# Patient Record
Sex: MALE | Race: WHITE | NOT HISPANIC OR LATINO
[De-identification: names, ages, dates, MRNs, and addresses within clinical notes are randomized per-mention and may not be internally consistent; named-entity substitution may affect disease eponyms.]

---

## 2017-01-20 ENCOUNTER — APPOINTMENT (OUTPATIENT)
Dept: PULMONOLOGY | Facility: CLINIC | Age: 72
End: 2017-01-20

## 2017-01-20 VITALS — DIASTOLIC BLOOD PRESSURE: 82 MMHG | HEART RATE: 70 BPM | SYSTOLIC BLOOD PRESSURE: 150 MMHG | OXYGEN SATURATION: 94 %

## 2017-01-20 VITALS — BODY MASS INDEX: 27.75 KG/M2 | WEIGHT: 222 LBS

## 2017-01-29 ENCOUNTER — RESULT REVIEW (OUTPATIENT)
Age: 72
End: 2017-01-29

## 2017-01-30 ENCOUNTER — APPOINTMENT (OUTPATIENT)
Dept: DERMATOLOGY | Facility: CLINIC | Age: 72
End: 2017-01-30

## 2017-02-03 ENCOUNTER — APPOINTMENT (OUTPATIENT)
Dept: DERMATOLOGY | Facility: CLINIC | Age: 72
End: 2017-02-03

## 2017-02-07 ENCOUNTER — RX RENEWAL (OUTPATIENT)
Age: 72
End: 2017-02-07

## 2017-04-20 ENCOUNTER — APPOINTMENT (OUTPATIENT)
Dept: PULMONOLOGY | Facility: CLINIC | Age: 72
End: 2017-04-20

## 2017-04-20 VITALS — HEART RATE: 64 BPM | OXYGEN SATURATION: 94 % | DIASTOLIC BLOOD PRESSURE: 70 MMHG | SYSTOLIC BLOOD PRESSURE: 140 MMHG

## 2017-04-20 VITALS — WEIGHT: 218 LBS | BODY MASS INDEX: 27.25 KG/M2

## 2017-04-20 DIAGNOSIS — M54.5 LOW BACK PAIN: ICD-10-CM

## 2017-04-20 DIAGNOSIS — Z01.89 ENCOUNTER FOR OTHER SPECIFIED SPECIAL EXAMINATIONS: ICD-10-CM

## 2017-04-20 RX ORDER — FEXOFENADINE HYDROCHLORIDE 180 MG/1
180 TABLET ORAL
Qty: 30 | Refills: 0 | Status: DISCONTINUED | COMMUNITY
Start: 2016-10-17

## 2017-04-26 ENCOUNTER — EMERGENCY (EMERGENCY)
Facility: HOSPITAL | Age: 72
LOS: 1 days | Discharge: HOME | End: 2017-04-26
Attending: EMERGENCY MEDICINE
Payer: MEDICARE

## 2017-04-26 VITALS
DIASTOLIC BLOOD PRESSURE: 73 MMHG | WEIGHT: 207.9 LBS | HEIGHT: 75 IN | SYSTOLIC BLOOD PRESSURE: 136 MMHG | RESPIRATION RATE: 18 BRPM | HEART RATE: 82 BPM | OXYGEN SATURATION: 100 % | TEMPERATURE: 100 F

## 2017-04-26 VITALS
SYSTOLIC BLOOD PRESSURE: 135 MMHG | HEART RATE: 79 BPM | OXYGEN SATURATION: 97 % | RESPIRATION RATE: 16 BRPM | DIASTOLIC BLOOD PRESSURE: 67 MMHG | TEMPERATURE: 100 F

## 2017-04-26 DIAGNOSIS — R07.9 CHEST PAIN, UNSPECIFIED: ICD-10-CM

## 2017-04-26 DIAGNOSIS — K21.9 GASTRO-ESOPHAGEAL REFLUX DISEASE WITHOUT ESOPHAGITIS: ICD-10-CM

## 2017-04-26 DIAGNOSIS — Z79.899 OTHER LONG TERM (CURRENT) DRUG THERAPY: ICD-10-CM

## 2017-04-26 DIAGNOSIS — R42 DIZZINESS AND GIDDINESS: ICD-10-CM

## 2017-04-26 LAB
ANION GAP SERPL CALC-SCNC: 15 MMOL/L — SIGNIFICANT CHANGE UP (ref 5–17)
BUN SERPL-MCNC: 33 MG/DL — HIGH (ref 8–20)
CALCIUM SERPL-MCNC: 10 MG/DL — SIGNIFICANT CHANGE UP (ref 8.6–10.2)
CHLORIDE SERPL-SCNC: 95 MMOL/L — LOW (ref 98–107)
CO2 SERPL-SCNC: 28 MMOL/L — SIGNIFICANT CHANGE UP (ref 22–29)
CREAT SERPL-MCNC: 0.87 MG/DL — SIGNIFICANT CHANGE UP (ref 0.5–1.3)
GLUCOSE SERPL-MCNC: 166 MG/DL — HIGH (ref 70–115)
HCT VFR BLD CALC: 48.4 % — SIGNIFICANT CHANGE UP (ref 42–52)
HGB BLD-MCNC: 16.3 G/DL — SIGNIFICANT CHANGE UP (ref 14–18)
MCHC RBC-ENTMCNC: 30.7 PG — SIGNIFICANT CHANGE UP (ref 27–31)
MCHC RBC-ENTMCNC: 33.7 G/DL — SIGNIFICANT CHANGE UP (ref 32–36)
MCV RBC AUTO: 91.1 FL — SIGNIFICANT CHANGE UP (ref 80–94)
PLATELET # BLD AUTO: 191 K/UL — SIGNIFICANT CHANGE UP (ref 150–400)
POTASSIUM SERPL-MCNC: 4.3 MMOL/L — SIGNIFICANT CHANGE UP (ref 3.5–5.3)
POTASSIUM SERPL-SCNC: 4.3 MMOL/L — SIGNIFICANT CHANGE UP (ref 3.5–5.3)
RBC # BLD: 5.31 M/UL — SIGNIFICANT CHANGE UP (ref 4.6–6.2)
RBC # FLD: 13.4 % — SIGNIFICANT CHANGE UP (ref 11–15.6)
SODIUM SERPL-SCNC: 138 MMOL/L — SIGNIFICANT CHANGE UP (ref 135–145)
TROPONIN T SERPL-MCNC: <0.01 NG/ML — SIGNIFICANT CHANGE UP (ref 0–0.06)
WBC # BLD: 10.2 K/UL — SIGNIFICANT CHANGE UP (ref 4.8–10.8)
WBC # FLD AUTO: 10.2 K/UL — SIGNIFICANT CHANGE UP (ref 4.8–10.8)

## 2017-04-26 PROCEDURE — 80048 BASIC METABOLIC PNL TOTAL CA: CPT

## 2017-04-26 PROCEDURE — 84484 ASSAY OF TROPONIN QUANT: CPT

## 2017-04-26 PROCEDURE — 71010: CPT | Mod: 26

## 2017-04-26 PROCEDURE — 93010 ELECTROCARDIOGRAM REPORT: CPT

## 2017-04-26 PROCEDURE — 93005 ELECTROCARDIOGRAM TRACING: CPT

## 2017-04-26 PROCEDURE — 99283 EMERGENCY DEPT VISIT LOW MDM: CPT | Mod: 25

## 2017-04-26 PROCEDURE — 71045 X-RAY EXAM CHEST 1 VIEW: CPT

## 2017-04-26 PROCEDURE — 99285 EMERGENCY DEPT VISIT HI MDM: CPT

## 2017-04-26 PROCEDURE — 85027 COMPLETE CBC AUTOMATED: CPT

## 2017-04-26 RX ORDER — ALBUTEROL 90 UG/1
0 AEROSOL, METERED ORAL
Qty: 0 | Refills: 0 | COMMUNITY

## 2017-04-26 RX ORDER — PANTOPRAZOLE SODIUM 20 MG/1
0 TABLET, DELAYED RELEASE ORAL
Qty: 0 | Refills: 0 | COMMUNITY

## 2017-04-26 RX ORDER — DOXYCYCLINE/OMEGA-3/EYE MASK 75 MG
0 COMBINATION PACKAGE (EA) ORAL
Qty: 0 | Refills: 0 | COMMUNITY

## 2017-04-26 NOTE — ED ADULT TRIAGE NOTE - CHIEF COMPLAINT QUOTE
pt bib ems s/p diaphoresis and dizziness at doctors office. pt c/o bilateral shoulder pain. pt states that he worked out yesterday. pt was given aspiring 325mg PO at doctors office. pt with no complaints at this time.

## 2017-04-26 NOTE — ED ADULT NURSE NOTE - OBJECTIVE STATEMENT
pt reports was at presurgical testing when he began to feel dizzy and became diaphoretic. pt reports he then developed rt shoulder pain radiating down the arm.. pt reports that symptoms have resolved. pt reports "worked out more than usual yesterday. was on the elliptical and treadmill a long time. I don't know if that has anything to do with it." pt has no complaints at this time. pt denies cardiac hx. a and o x3. escamilla. perrl. breathing even and unlabored. lungs cta, cap refill brisk. skin w/d/i. no le edema. + and = peripheral pulses. nsr on cm. abdomen soft nontender nondistended. will continue to monitor.

## 2017-04-26 NOTE — ED PROVIDER NOTE - OBJECTIVE STATEMENT
pt presents with brief epsiode chest taylor achy with diaphoresis now completely resolved. denies fever. denies HA or neck pain. no  sob. no abd pain. no n/v/d. no urinary f/u/d. no back pain. no motor or sensory deficits. denies drug use. no recent travel. no rash. no other acute issues symptoms or concerns no swelling

## 2017-04-26 NOTE — ED ADULT NURSE NOTE - PMH
BPH (benign prostatic hyperplasia)    GERD (gastroesophageal reflux disease)    Inguinal hernia    Skin cancer

## 2017-04-26 NOTE — ED PROVIDER NOTE - CHIEF COMPLAINT
The patient is a 71y Male complaining of dizziness.
Patient/Caregiver provided printed discharge information.

## 2017-04-26 NOTE — ED PROVIDER NOTE - MEDICAL DECISION MAKING DETAILS
DDX considered: stable angina, aortic dissection, pericarditis, pneumonia, pe, pts, chest wall pain, esophageal spasm and abdominal emergencies. I have discussed with patient with negative troponin and normal ekg their 28 day cardiac risk and they have agreed to outpt cardio f/ut his week without fail. asa per day until then. return to the ed immediately for any intractable chest pain or sob. pt agrees to plan of care

## 2017-05-01 ENCOUNTER — APPOINTMENT (OUTPATIENT)
Dept: DERMATOLOGY | Facility: CLINIC | Age: 72
End: 2017-05-01

## 2017-08-22 ENCOUNTER — APPOINTMENT (OUTPATIENT)
Dept: PULMONOLOGY | Facility: CLINIC | Age: 72
End: 2017-08-22
Payer: MEDICARE

## 2017-08-22 VITALS — OXYGEN SATURATION: 97 % | HEART RATE: 55 BPM | SYSTOLIC BLOOD PRESSURE: 110 MMHG | DIASTOLIC BLOOD PRESSURE: 60 MMHG

## 2017-08-22 VITALS — BODY MASS INDEX: 26.75 KG/M2 | WEIGHT: 214 LBS

## 2017-08-22 PROCEDURE — 99214 OFFICE O/P EST MOD 30 MIN: CPT | Mod: 25

## 2017-08-22 PROCEDURE — 94010 BREATHING CAPACITY TEST: CPT

## 2017-09-08 ENCOUNTER — OTHER (OUTPATIENT)
Age: 72
End: 2017-09-08

## 2017-10-10 ENCOUNTER — APPOINTMENT (OUTPATIENT)
Dept: DERMATOLOGY | Facility: CLINIC | Age: 72
End: 2017-10-10
Payer: MEDICARE

## 2017-10-10 PROCEDURE — 99214 OFFICE O/P EST MOD 30 MIN: CPT | Mod: 25

## 2017-10-10 PROCEDURE — 17110 DESTRUCTION B9 LES UP TO 14: CPT

## 2017-10-23 ENCOUNTER — APPOINTMENT (OUTPATIENT)
Dept: DERMATOLOGY | Facility: CLINIC | Age: 72
End: 2017-10-23

## 2018-04-10 ENCOUNTER — APPOINTMENT (OUTPATIENT)
Dept: DERMATOLOGY | Facility: CLINIC | Age: 73
End: 2018-04-10
Payer: MEDICARE

## 2018-04-10 PROCEDURE — 99213 OFFICE O/P EST LOW 20 MIN: CPT

## 2018-04-16 ENCOUNTER — APPOINTMENT (OUTPATIENT)
Dept: DERMATOLOGY | Facility: CLINIC | Age: 73
End: 2018-04-16

## 2018-05-04 ENCOUNTER — RECORD ABSTRACTING (OUTPATIENT)
Age: 73
End: 2018-05-04

## 2018-05-04 DIAGNOSIS — Z82.49 FAMILY HISTORY OF ISCHEMIC HEART DISEASE AND OTHER DISEASES OF THE CIRCULATORY SYSTEM: ICD-10-CM

## 2018-05-04 RX ORDER — CALCIUM CARBONATE/VITAMIN D3 600 MG-10
TABLET ORAL
Refills: 0 | Status: ACTIVE | COMMUNITY

## 2018-05-04 RX ORDER — CHROMIUM 200 MCG
TABLET ORAL
Refills: 0 | Status: ACTIVE | COMMUNITY

## 2018-07-10 ENCOUNTER — APPOINTMENT (OUTPATIENT)
Dept: CARDIOLOGY | Facility: CLINIC | Age: 73
End: 2018-07-10

## 2018-07-18 ENCOUNTER — APPOINTMENT (OUTPATIENT)
Dept: CARDIOLOGY | Facility: CLINIC | Age: 73
End: 2018-07-18
Payer: MEDICARE

## 2018-07-18 VITALS
DIASTOLIC BLOOD PRESSURE: 90 MMHG | BODY MASS INDEX: 25.86 KG/M2 | RESPIRATION RATE: 18 BRPM | HEART RATE: 65 BPM | SYSTOLIC BLOOD PRESSURE: 150 MMHG | HEIGHT: 75 IN | WEIGHT: 208 LBS

## 2018-07-18 PROBLEM — K40.90 UNILATERAL INGUINAL HERNIA, WITHOUT OBSTRUCTION OR GANGRENE, NOT SPECIFIED AS RECURRENT: Chronic | Status: ACTIVE | Noted: 2017-04-26

## 2018-07-18 PROBLEM — N40.0 BENIGN PROSTATIC HYPERPLASIA WITHOUT LOWER URINARY TRACT SYMPTOMS: Chronic | Status: ACTIVE | Noted: 2017-04-26

## 2018-07-18 PROBLEM — C44.90 UNSPECIFIED MALIGNANT NEOPLASM OF SKIN, UNSPECIFIED: Chronic | Status: ACTIVE | Noted: 2017-04-26

## 2018-07-18 PROBLEM — K21.9 GASTRO-ESOPHAGEAL REFLUX DISEASE WITHOUT ESOPHAGITIS: Chronic | Status: ACTIVE | Noted: 2017-04-26

## 2018-07-18 PROCEDURE — 93000 ELECTROCARDIOGRAM COMPLETE: CPT

## 2018-07-18 PROCEDURE — 99214 OFFICE O/P EST MOD 30 MIN: CPT

## 2018-07-20 ENCOUNTER — APPOINTMENT (OUTPATIENT)
Dept: PULMONOLOGY | Facility: CLINIC | Age: 73
End: 2018-07-20
Payer: MEDICARE

## 2018-07-20 VITALS — DIASTOLIC BLOOD PRESSURE: 84 MMHG | SYSTOLIC BLOOD PRESSURE: 128 MMHG

## 2018-07-20 VITALS — WEIGHT: 207 LBS | BODY MASS INDEX: 25.87 KG/M2

## 2018-07-20 DIAGNOSIS — Z82.49 FAMILY HISTORY OF ISCHEMIC HEART DISEASE AND OTHER DISEASES OF THE CIRCULATORY SYSTEM: ICD-10-CM

## 2018-07-20 PROCEDURE — 99215 OFFICE O/P EST HI 40 MIN: CPT | Mod: 25

## 2018-07-20 PROCEDURE — 94729 DIFFUSING CAPACITY: CPT

## 2018-07-20 PROCEDURE — 85018 HEMOGLOBIN: CPT | Mod: QW

## 2018-07-20 PROCEDURE — 94727 GAS DIL/WSHOT DETER LNG VOL: CPT

## 2018-07-20 PROCEDURE — 94010 BREATHING CAPACITY TEST: CPT

## 2018-07-20 RX ORDER — FEXOFENADINE HCL 60 MG
CAPSULE ORAL
Refills: 0 | Status: DISCONTINUED | COMMUNITY
End: 2018-07-20

## 2018-07-26 ENCOUNTER — CLINICAL ADVICE (OUTPATIENT)
Age: 73
End: 2018-07-26

## 2018-08-21 ENCOUNTER — APPOINTMENT (OUTPATIENT)
Dept: PULMONOLOGY | Facility: CLINIC | Age: 73
End: 2018-08-21
Payer: MEDICARE

## 2018-08-21 VITALS — DIASTOLIC BLOOD PRESSURE: 70 MMHG | HEART RATE: 65 BPM | OXYGEN SATURATION: 96 % | SYSTOLIC BLOOD PRESSURE: 132 MMHG

## 2018-08-21 VITALS — BODY MASS INDEX: 26 KG/M2 | WEIGHT: 208 LBS

## 2018-08-21 PROCEDURE — 99214 OFFICE O/P EST MOD 30 MIN: CPT

## 2018-08-21 RX ORDER — ALBUTEROL SULFATE 90 UG/1
108 (90 BASE) AEROSOL, METERED RESPIRATORY (INHALATION)
Qty: 1 | Refills: 3 | Status: DISCONTINUED | COMMUNITY
Start: 2017-01-20 | End: 2018-08-21

## 2018-08-23 ENCOUNTER — APPOINTMENT (OUTPATIENT)
Dept: CARDIOLOGY | Facility: CLINIC | Age: 73
End: 2018-08-23
Payer: MEDICARE

## 2018-08-23 PROCEDURE — 93306 TTE W/DOPPLER COMPLETE: CPT

## 2018-09-20 ENCOUNTER — APPOINTMENT (OUTPATIENT)
Dept: CARDIOLOGY | Facility: CLINIC | Age: 73
End: 2018-09-20
Payer: MEDICARE

## 2018-09-20 PROCEDURE — 93351 STRESS TTE COMPLETE: CPT

## 2018-10-11 ENCOUNTER — APPOINTMENT (OUTPATIENT)
Dept: DERMATOLOGY | Facility: CLINIC | Age: 73
End: 2018-10-11
Payer: MEDICARE

## 2018-10-11 PROCEDURE — 99214 OFFICE O/P EST MOD 30 MIN: CPT

## 2018-11-05 ENCOUNTER — APPOINTMENT (OUTPATIENT)
Dept: CARDIOLOGY | Facility: CLINIC | Age: 73
End: 2018-11-05
Payer: MEDICARE

## 2018-11-05 VITALS
HEART RATE: 65 BPM | DIASTOLIC BLOOD PRESSURE: 68 MMHG | SYSTOLIC BLOOD PRESSURE: 145 MMHG | HEIGHT: 75 IN | RESPIRATION RATE: 18 BRPM | BODY MASS INDEX: 26.61 KG/M2 | WEIGHT: 214 LBS

## 2018-11-05 PROCEDURE — 93000 ELECTROCARDIOGRAM COMPLETE: CPT

## 2018-11-05 PROCEDURE — 99214 OFFICE O/P EST MOD 30 MIN: CPT

## 2018-11-05 NOTE — PHYSICAL EXAM
[FreeTextEntry1] :                    Well appearing and nourished with no obvious deformities or distress.\par \par Eyes: \par No conjunctival injection and no xanthelasmas.\par HEENT: \par Normocephalic.Normal oral mucosa. No pallor or cyanosis\par Neck: \par No jugular venous distension. with normal A and V wave forms. No palpable adenopathy.\par Cardiovascular: \par Normal rate and rhythm with normal S1, S2 and a grade 1/6 systolic murmur. Distal arterial pulses are normal. No significant peripheral edema.\par Pulmonary: \par Lungs are clear to auscultation and percussion. Normal respiratory pattern without any accessory muscle use\par Abdomen: \par Soft, non-tender ; no palpable organomegaly or masses.\par Extremities:\par No digital clubbing, cyanosis or ischemic changes.\par Skin: \par No skin lesions, rashes, ulcers or xanthomas.\par Psychiatric: \par Alert and oriented to person, place and time. Appropriate mood and affect.

## 2018-11-05 NOTE — ASSESSMENT
[FreeTextEntry1] : The ECG shows sinus rhythm at 66 bpm.  Normal axis and intervals and no significant ST-T wave changes. \par \par Stress echo 9/20/18:\par Patient exercised 10 minutes 30 seconds (11 Mets).\par Peak heart rate 139 (93% maximum).\par Blood pressure began high but response is normal\par ECG showed no ischemic changes\par Baseline left ventricular ejection fraction 60-65% augmenting appropriately to 70-75% with exercise without any wall motion denies. Test is considered negative for ischemia.\par \par \par Laboratory data on 4/13/18 shows normal electrolytes and LFTs.  \par Cholesterol 163\par HDL 72\par LDL 82 \par Hemoglobin 13.8\par BUN a bit high at 33.  Fairly similar to laboratory data from October of 2017.  \par \par Impression:\par 1.  The patient has exertional dyspnea.Based on the results of the stress testing, he seems to have reasonably good exercise tolerance without any evidence of exercise-induced ischemia. Do not believe that there is a cardiac etiology to the shortness of breath.\par 2.  Blood pressure seems to be running persistently high and with LVH on Echo he requires Rx.\par 3.  Lipid control seems to be adequate.\par \par \par Recommendations:\par 1.  Home blood pressure monitoring and keep a chart which he will call in with in 3-4 weeks\par 2.  Reducing sodium intake.  This will be especially important when he travels to [place] shortly.\par 3.  Rx Lisinopril 10 mg Po daily\par 4.  Repeat an echocardiogram to reassess cardiac chamber enlargement.\par 6.  Follow-up 3 months

## 2018-11-05 NOTE — REASON FOR VISIT
[FreeTextEntry1] : This is a 73-year-old male who presents here for cardiac reevaluation with concerns about some exertional shortness of breath.  He denies chest pain or palpitations.  No paroxysmal nocturnal dyspnea, orthopnea, or edema. \par \par   His history includes: \par 1.  A significant family history of premature coronary artery disease. \par  2.  A history of hyperlipidemia.\par 3.   He denies hypertension but does have left ventricular hypertrophy and biatrial enlargement. \par Review of his blood pressure log from home over the course of last month shows persistent elevation of his systolic blood pressures in the mid 140s on average.\par Diastolics are in the 70s to 80 range.\par \par  He does not have diabetes.  He continues to take his meds.   He did have some problem with esophageal ulcers and therefore is not taking Aspirin.

## 2018-11-26 ENCOUNTER — OTHER (OUTPATIENT)
Age: 73
End: 2018-11-26

## 2018-11-26 RX ORDER — LISINOPRIL 10 MG/1
10 TABLET ORAL DAILY
Qty: 90 | Refills: 3 | Status: DISCONTINUED | COMMUNITY
Start: 2018-11-05 | End: 2018-11-26

## 2018-12-19 ENCOUNTER — APPOINTMENT (OUTPATIENT)
Dept: CARDIOLOGY | Facility: CLINIC | Age: 73
End: 2018-12-19
Payer: MEDICARE

## 2018-12-19 VITALS
BODY MASS INDEX: 26.49 KG/M2 | DIASTOLIC BLOOD PRESSURE: 70 MMHG | HEART RATE: 63 BPM | WEIGHT: 213 LBS | SYSTOLIC BLOOD PRESSURE: 122 MMHG | HEIGHT: 75 IN | RESPIRATION RATE: 16 BRPM

## 2018-12-19 PROCEDURE — 99214 OFFICE O/P EST MOD 30 MIN: CPT

## 2018-12-19 PROCEDURE — 93000 ELECTROCARDIOGRAM COMPLETE: CPT

## 2018-12-19 NOTE — ASSESSMENT
[FreeTextEntry1] : The ECG shows sinus rhythm at 63 bpm.  Normal axis and intervals and no significant ST-T wave changes. \par \par Assessment of his home blood pressure cuff versus our wall cuff was made. Multiple assessment indicate that his blood pressure cuff is clearly an error registering at least 15 mm too high.\par \par Stress echo 9/20/18:\par Patient exercised 10 minutes 30 seconds (11 Mets).\par Peak heart rate 139 (93% maximum).\par Blood pressure began high but response is normal\par ECG showed no ischemic changes\par Baseline left ventricular ejection fraction 60-65% augmenting appropriately to 70-75% with exercise without any wall motion denies. \par Test is considered negative for ischemia.\par \par \par Laboratory data 12/14/18:\par Electrolytes and LFTs are normal\par Cholesterol 155\par HDL 51\par LDL 93\par Hemoglobin 14.1\par \par \par Impression:\par 1.  The patient has exertional dyspnea.Based on the results of the stress testing, he seems to have reasonably good exercise tolerance without any evidence of exercise-induced ischemia. Do not believe that there is a cardiac etiology to the shortness of breath.\par \par 2.  Blood pressure seems to be controlled with Irbesartan.\par \par 3.  Lipid control seems to be adequate.\par \par \par Recommendations:\par 1.  Home blood pressure monitor is faulty and should be replaced and he should check BP later in the day\par 2.  Reducing sodium intake.  This will be especially important when he travels\par 3.  Follow-up 3 months

## 2018-12-19 NOTE — REASON FOR VISIT
[FreeTextEntry1] : This is a 73-year-old male who presents here for cardiac reevaluation with concerns about\par \par - treatment of his HTN\par - some exertional shortness of breath. \par  He denies chest pain or palpitations.  No paroxysmal nocturnal dyspnea, orthopnea, or edema. \par \par   His history includes: \par 1.  A significant family history of premature coronary artery disease. \par  2.  A history of hyperlipidemia.\par 3. Newly dx'd hypertension with  left ventricular hypertrophy and biatrial enlargement. \par \par Was begun on lisinopril 10 mg p.o. daily end of November. \par He subsequently developed a dry cough and this was stopped.\par Irbesartan 150 mg daily was begun November 26. This has been better tolerated.\par  His blood pressures at home we made a bit high, however, he has been taking this first thing in the morning before he takes his medication.\par \par \par  He does not have diabetes.  He continues to take his meds.   He did have some problem with esophageal ulcers and therefore is not taking Aspirin.

## 2018-12-20 ENCOUNTER — APPOINTMENT (OUTPATIENT)
Dept: PULMONOLOGY | Facility: CLINIC | Age: 73
End: 2018-12-20
Payer: MEDICARE

## 2018-12-20 VITALS
BODY MASS INDEX: 25.62 KG/M2 | WEIGHT: 205 LBS | OXYGEN SATURATION: 98 % | SYSTOLIC BLOOD PRESSURE: 106 MMHG | DIASTOLIC BLOOD PRESSURE: 58 MMHG | HEART RATE: 64 BPM

## 2018-12-20 PROCEDURE — 99214 OFFICE O/P EST MOD 30 MIN: CPT

## 2019-03-20 ENCOUNTER — NON-APPOINTMENT (OUTPATIENT)
Age: 74
End: 2019-03-20

## 2019-03-20 ENCOUNTER — RECORD ABSTRACTING (OUTPATIENT)
Age: 74
End: 2019-03-20

## 2019-03-20 ENCOUNTER — APPOINTMENT (OUTPATIENT)
Dept: CARDIOLOGY | Facility: CLINIC | Age: 74
End: 2019-03-20
Payer: MEDICARE

## 2019-03-20 VITALS
HEART RATE: 62 BPM | WEIGHT: 212 LBS | DIASTOLIC BLOOD PRESSURE: 78 MMHG | HEIGHT: 75 IN | SYSTOLIC BLOOD PRESSURE: 120 MMHG | RESPIRATION RATE: 16 BRPM | BODY MASS INDEX: 26.36 KG/M2

## 2019-03-20 PROCEDURE — 99214 OFFICE O/P EST MOD 30 MIN: CPT

## 2019-03-20 PROCEDURE — 93000 ELECTROCARDIOGRAM COMPLETE: CPT

## 2019-03-20 NOTE — REASON FOR VISIT
[FreeTextEntry1] : This is a 73-year-old male who presents here for cardiac reevaluation with concerns about\par \par - treatment of his HTN\par - some exertional shortness of breath. \par - HLD\par - MARILUZ on CPAP\par  He denies chest pain or palpitations.  No paroxysmal nocturnal dyspnea, orthopnea, or edema. \par \par   His history includes: \par 1.  A significant family history of premature coronary artery disease. \par  2.  A history of hyperlipidemia.\par 3.  Newly dx'd hypertension with  left ventricular hypertrophy and biatrial enlargement. \par \par Was begun on lisinopril 10 mg p.o. daily end of November. \par He subsequently developed a dry cough and this was stopped.\par Irbesartan 150 mg daily was begun November 26. This has been better tolerated.\par  His blood pressures at home running 105 - 140/60-70 \par Patient denies any chest pain, shortness of breath, palpitations, PND, orthopnea or edema\par \par \par  He does not have diabetes.  He continues to take his meds.   He did have some problem with esophageal ulcers and therefore is not taking Aspirin.

## 2019-03-20 NOTE — ASSESSMENT
[FreeTextEntry1] : The ECG shows sinus rhythm at 62 bpm.  Normal axis and intervals and no significant ST-T wave changes. \par \par Repeat assessment of his home blood pressure cuff versus our wall cuff was made and iindicates that his blood pressure cuff is possibly off by 10 mmHg\par \par Stress echo 9/20/18:\par Patient exercised 10 minutes 30 seconds (11 Mets).\par Peak heart rate 139 (93% maximum).\par Blood pressure began high but response is normal\par ECG showed no ischemic changes\par Baseline left ventricular ejection fraction 60-65% augmenting appropriately to 70-75% with exercise without any wall motion denies. \par Test is considered negative for ischemia.\par \par \par Laboratory data 12/14/18:\par Electrolytes and LFTs are normal\par Cholesterol 155\par HDL 51\par LDL 93\par Hemoglobin 14.1\par \par \par Impression:\par 1.  The patient has exertional dyspnea.Based on the results of the stress testing, he seems to have reasonably good exercise tolerance without any evidence of exercise-induced ischemia. Do not believe that there is a cardiac etiology to the shortness of breath.\par \par 2.  Blood pressure seems to be controlled with Irbesartan.\par \par 3.  Lipid control seems to be adequate.\par \par 4. MARILUZ is being consistently treated\par \par Recommendations:\par 1.  Home blood pressure monitoring to continue\par 2.  Reducing sodium intake.  This will be especially important when he travels\par 3.  Full panel of labs by May \par 4. Follow-up 6 months

## 2019-04-08 ENCOUNTER — APPOINTMENT (OUTPATIENT)
Dept: DERMATOLOGY | Facility: CLINIC | Age: 74
End: 2019-04-08
Payer: MEDICARE

## 2019-04-08 PROCEDURE — 17000 DESTRUCT PREMALG LESION: CPT

## 2019-04-08 PROCEDURE — 99214 OFFICE O/P EST MOD 30 MIN: CPT | Mod: 25

## 2019-05-08 ENCOUNTER — RX CHANGE (OUTPATIENT)
Age: 74
End: 2019-05-08

## 2019-05-08 RX ORDER — IRBESARTAN 150 MG/1
150 TABLET ORAL DAILY
Qty: 90 | Refills: 3 | Status: DISCONTINUED | COMMUNITY
Start: 2018-11-26 | End: 2019-05-08

## 2019-06-14 ENCOUNTER — APPOINTMENT (OUTPATIENT)
Dept: PULMONOLOGY | Facility: CLINIC | Age: 74
End: 2019-06-14
Payer: MEDICARE

## 2019-06-14 VITALS
DIASTOLIC BLOOD PRESSURE: 72 MMHG | WEIGHT: 205 LBS | BODY MASS INDEX: 25.62 KG/M2 | OXYGEN SATURATION: 97 % | SYSTOLIC BLOOD PRESSURE: 126 MMHG | HEART RATE: 81 BPM

## 2019-06-14 PROCEDURE — 99214 OFFICE O/P EST MOD 30 MIN: CPT

## 2019-06-14 RX ORDER — POLYMYXIN B SULFATE AND TRIMETHOPRIM 10000; 1 [USP'U]/ML; MG/ML
10000-0.1 SOLUTION OPHTHALMIC
Qty: 10 | Refills: 0 | Status: DISCONTINUED | COMMUNITY
Start: 2019-04-18

## 2019-06-14 RX ORDER — TROSPIUM CHLORIDE 20 MG/1
TABLET, FILM COATED ORAL
Refills: 0 | Status: DISCONTINUED | COMMUNITY
End: 2019-06-14

## 2019-06-14 NOTE — DISCUSSION/SUMMARY
[FreeTextEntry1] : \par #1. Completed Breo and now no longer on chronic BD therapy; symptomatically he is back to baseline; prior MCT was negative for asthma\par #2. ProAir as needed\par #3. Continue CPAP at 10 cm of water with new machine\par #4. Replace equipment as needed; ordered 6/14/19\par #5. F/u in 6 month with compliance \par #6. Repeat PFTs were essentially normal off of therapy\par #7. Consider repeat chest CT to reevaluate bronchial thickening but he would like to hold off for now given resolution of symptoms and normal PFTs\par #8. Uses SoClean

## 2019-06-14 NOTE — REVIEW OF SYSTEMS
[As Noted in HPI] : as noted in HPI [Fever] : no fever [Chills] : no chills [Dry Eyes] : no dryness of the eyes [Eye Irritation] : no ~T irritation of the eyes [Nasal Congestion] : no nasal congestion [Epistaxis] : no nosebleeds [Postnasal Drip] : no postnasal drip [Sinus Problems] : no sinus problems [Cough] : no cough [Hemoptysis] : no hemoptysis [Sputum] : not coughing up ~M sputum [Dyspnea] : no dyspnea [Chest Tightness] : no chest tightness [Pleuritic Pain] : no pleuritic pain [Wheezing] : no wheezing [Hypertension] : no ~T hypertension [Chest Discomfort] : no chest discomfort [Dysrhythmia] : no dysrhythmia [Murmurs] : no murmurs were heard [Palpitations] : no palpitations [Edema] : ~T edema was not present [Hay Fever] : no hay fever [Itchy Eyes] : no itching of ~T the eyes [Reflux] : no reflux [Nausea] : no nausea [Vomiting] : no vomiting [Constipation] : no constipation [Diarrhea] : no diarrhea [Trauma] : no ~T physical trauma [Abdominal Pain] : no abdominal pain [Dysuria] : no dysuria [Fracture] : no fracture [Headache] : no headache [Anemia] : no anemia [Dizziness] : no dizziness [Syncope] : no fainting [Numbness] : no numbness [Depression] : no depression [Paralysis] : no paralysis was seen [Seizures] : no seizures [Anxiety] : no anxiety [Diabetes] : no diabetes mellitus [Thyroid Problem] : no thyroid problem

## 2019-06-14 NOTE — CONSULT LETTER
[Consult Letter:] : I had the pleasure of evaluating your patient, [unfilled]. [Please see my note below.] : Please see my note below. [Dear  ___] : Dear  [unfilled], [Sincerely,] : Sincerely, [Consult Closing:] : Thank you very much for allowing me to participate in the care of this patient.  If you have any questions, please do not hesitate to contact me. [DrKeyla  ___] : Dr. ROONEY [Clark Nevarez MD] : Clark Nevarez MD [FreeTextEntry3] : Clark Nevarez MD, FCCP, JAYLIN. ABSM\par

## 2019-06-14 NOTE — HISTORY OF PRESENT ILLNESS
[CPAP] : CPAP [Follow-Up - Routine Clinic] : a routine clinic follow-up of [Intermittent] : intermittent [None] : No associated symptoms are reported [Inhaled Short-Acting Beta-2 Agonists] : inhaled short-acting beta-2 agonists [Good Compliance] : good compliance with treatment [Good Tolerance] : good tolerance of treatment [Good Symptom Control] : good symptom control [CT Scan] : a CT scan [On ___] : performed on [unfilled] [Indication ___] : for an indication of [unfilled] [Patient] : the patient [FreeTextEntry1] : The patient was suffering from cough, nasal congestion, wheeze, and SOB. CXR was without infiltrates. He was treated with abx and steroids but without significant improvement. He subsequently underwent chest CT and sinus CT which revealed sinusitis and bronchial thickening but without pneumonia. He was started on steroids and Breo now with near resolution of symptoms. He is concerned that his CPAP therapy may have contributed to his infection though he has been on CPAP for many years. \par He is now near baseline. [de-identified] : at 8 cm of water [FreeTextEntry5] : chest and sinus CT [FreeTextEntry8] : Bronchial thickening without consolidation or infiltrates; sinus thickening c/w sinusitis

## 2019-06-14 NOTE — PHYSICAL EXAM
[General Appearance - Well Developed] : well developed [Normal Appearance] : normal appearance [Normal Conjunctiva] : the conjunctiva exhibited no abnormalities [General Appearance - In No Acute Distress] : no acute distress [Elongated Uvula] : elongated uvula [Enlarged Base of the Tongue] : enlargement of the base of the tongue [Low Lying Soft Palate] : low lying soft palate [III] : III [Neck Appearance] : the appearance of the neck was normal [Heart Sounds] : normal S1 and S2 [Murmurs] : no murmurs present [Heart Rate And Rhythm] : heart rate and rhythm were normal [Edema] : no peripheral edema present [] : no respiratory distress [Respiration, Rhythm And Depth] : normal respiratory rhythm and effort [Auscultation Breath Sounds / Voice Sounds] : lungs were clear to auscultation bilaterally [Exaggerated Use Of Accessory Muscles For Inspiration] : no accessory muscle use [Abdomen Soft] : soft [Nail Clubbing] : no clubbing of the fingernails [Abnormal Walk] : normal gait [Abdomen Tenderness] : non-tender [No Focal Deficits] : no focal deficits [Oriented To Time, Place, And Person] : oriented to person, place, and time [Cyanosis, Localized] : no localized cyanosis [FreeTextEntry1] : Moderate oropharyngeal crowding.

## 2019-06-14 NOTE — REASON FOR VISIT
[Asthma] : asthma [Follow-Up] : a follow-up visit [Sleep Apnea] : sleep apnea [Shortness of Breath] : shortness of Breath [Cough] : cough [FreeTextEntry2] : weight issues

## 2019-09-19 ENCOUNTER — APPOINTMENT (OUTPATIENT)
Dept: CARDIOLOGY | Facility: CLINIC | Age: 74
End: 2019-09-19
Payer: MEDICARE

## 2019-09-19 ENCOUNTER — NON-APPOINTMENT (OUTPATIENT)
Age: 74
End: 2019-09-19

## 2019-09-19 VITALS
BODY MASS INDEX: 26.36 KG/M2 | DIASTOLIC BLOOD PRESSURE: 80 MMHG | SYSTOLIC BLOOD PRESSURE: 140 MMHG | HEIGHT: 75 IN | RESPIRATION RATE: 16 BRPM | WEIGHT: 212 LBS | OXYGEN SATURATION: 98 % | HEART RATE: 60 BPM

## 2019-09-19 PROCEDURE — 99214 OFFICE O/P EST MOD 30 MIN: CPT

## 2019-09-19 PROCEDURE — 93000 ELECTROCARDIOGRAM COMPLETE: CPT

## 2019-09-19 NOTE — HISTORY OF PRESENT ILLNESS
[FreeTextEntry1] : Was begun on lisinopril 10 mg p.o. daily  11/18\par He subsequently developed a dry cough and this was stopped.\par Irbesartan 150 mg daily was begun November 26/18. This has been better tolerated but changed to telmisartan bec of shortages.\par \par History of esophageal ulcers and therefore is not taking Aspirin.

## 2019-09-19 NOTE — ASSESSMENT
[FreeTextEntry1] : The ECG shows sinus rhythm at 60 bpm otherwise WNL\par \par Lab data  4/10/19\par Chol 141\par LDL   76\par HDL   50\par Tri.    69\par A1C 5.1\par LFTS WNL\par Creat 0.89\par \par Stress echo 9/20/18:\par Patient exercised 10 minutes 30 seconds (11 Mets).\par Peak heart rate 139 (93% maximum).\par Blood pressure began high but response is normal\par ECG showed no ischemic changes\par Baseline left ventricular ejection fraction 60-65% augmenting appropriately to 70-75% with exercise without any wall motion denies. \par Test is considered negative for ischemia.\par \par \par \par Impression:\par 1.  The patient has  mild exertional dyspnea.Based on the results of the stress testing, he seems to have reasonably good exercise tolerance without any evidence of exercise-induced ischemia. Do not believe that there is a cardiac etiology to the shortness of breath.\par \par 2.  Blood pressure seems to be controlled with Telmisartan. Today 140/80 and stable.\par \par 3.  Lipid control is adequate.\par \par 4. MARILUZ is being consistently treated\par \par Recommendations:\par 1.  Home blood pressure monitoring to continue\par 2.  Reducing sodium intake.  This will be especially important when he travels\par 3. Blood work to be checked every 6 months moving forward.\par \par Clinical follow up in 6 months

## 2019-09-19 NOTE — REASON FOR VISIT
[FreeTextEntry1] : This is a 74-year-old male who presents here for cardiac reevaluation. During our last visit we discussed his concerns of :\par \par - treatment of his HTN\par - some exertional shortness of breath. \par - HLD\par - MARILUZ on CPAP\par  He denies chest pain or palpitations.  No paroxysmal nocturnal dyspnea, orthopnea, or edema. \par \par   His history includes: \par 1.  A significant family history of premature coronary artery disease. \par  2.  A history of hyperlipidemia.\par 3.  Newly dx'd hypertension with  left ventricular hypertrophy and biatrial enlargement. \par \par \par Patient denies any chest pain, shortness of breath, palpitations, PND, orthopnea or edema\par \par Some mild SAVAGE during a recent trip to [employer] while climbing stairs otherwise no exertional discomfort.\par \par  States blood pressures have been controlled and continues to use his CPAP nightly\par \par   He continues to take his meds.

## 2019-10-08 ENCOUNTER — APPOINTMENT (OUTPATIENT)
Dept: DERMATOLOGY | Facility: CLINIC | Age: 74
End: 2019-10-08
Payer: MEDICARE

## 2019-10-08 PROCEDURE — 99213 OFFICE O/P EST LOW 20 MIN: CPT

## 2019-10-11 ENCOUNTER — RX RENEWAL (OUTPATIENT)
Age: 74
End: 2019-10-11

## 2019-10-17 ENCOUNTER — RESULT REVIEW (OUTPATIENT)
Age: 74
End: 2019-10-17

## 2019-10-17 ENCOUNTER — OTHER (OUTPATIENT)
Age: 74
End: 2019-10-17

## 2019-10-17 RX ORDER — TELMISARTAN 40 MG/1
40 TABLET ORAL DAILY
Qty: 90 | Refills: 1 | Status: DISCONTINUED | COMMUNITY
Start: 2019-05-08 | End: 2019-10-17

## 2019-11-26 ENCOUNTER — APPOINTMENT (OUTPATIENT)
Dept: CARDIOLOGY | Facility: CLINIC | Age: 74
End: 2019-11-26

## 2019-12-21 ENCOUNTER — TRANSCRIPTION ENCOUNTER (OUTPATIENT)
Age: 74
End: 2019-12-21

## 2020-01-06 ENCOUNTER — APPOINTMENT (OUTPATIENT)
Dept: PULMONOLOGY | Facility: CLINIC | Age: 75
End: 2020-01-06
Payer: MEDICARE

## 2020-01-06 VITALS — SYSTOLIC BLOOD PRESSURE: 120 MMHG | HEART RATE: 60 BPM | DIASTOLIC BLOOD PRESSURE: 74 MMHG | OXYGEN SATURATION: 97 %

## 2020-01-06 VITALS — WEIGHT: 213 LBS | HEIGHT: 75 IN | BODY MASS INDEX: 26.49 KG/M2

## 2020-01-06 PROCEDURE — 94010 BREATHING CAPACITY TEST: CPT

## 2020-01-06 PROCEDURE — 99214 OFFICE O/P EST MOD 30 MIN: CPT | Mod: 25

## 2020-01-06 NOTE — REVIEW OF SYSTEMS
[As Noted in HPI] : as noted in HPI [Fever] : no fever [Chills] : no chills [Dry Eyes] : no dryness of the eyes [Eye Irritation] : no ~T irritation of the eyes [Nasal Congestion] : no nasal congestion [Postnasal Drip] : no postnasal drip [Epistaxis] : no nosebleeds [Sinus Problems] : no sinus problems [Cough] : no cough [Sputum] : not coughing up ~M sputum [Hemoptysis] : no hemoptysis [Dyspnea] : no dyspnea [Chest Tightness] : no chest tightness [Pleuritic Pain] : no pleuritic pain [Wheezing] : no wheezing [Hypertension] : no ~T hypertension [Chest Discomfort] : no chest discomfort [Dysrhythmia] : no dysrhythmia [Murmurs] : no murmurs were heard [Palpitations] : no palpitations [Edema] : ~T edema was not present [Hay Fever] : no hay fever [Itchy Eyes] : no itching of ~T the eyes [Reflux] : no reflux [Nausea] : no nausea [Vomiting] : no vomiting [Constipation] : no constipation [Diarrhea] : no diarrhea [Abdominal Pain] : no abdominal pain [Trauma] : no ~T physical trauma [Dysuria] : no dysuria [Fracture] : no fracture [Anemia] : no anemia [Headache] : no headache [Dizziness] : no dizziness [Syncope] : no fainting [Numbness] : no numbness [Paralysis] : no paralysis was seen [Seizures] : no seizures [Depression] : no depression [Anxiety] : no anxiety [Diabetes] : no diabetes mellitus [Thyroid Problem] : no thyroid problem

## 2020-01-06 NOTE — HISTORY OF PRESENT ILLNESS
[CPAP] : CPAP [Follow-Up - Routine Clinic] : a routine clinic follow-up of [Intermittent] : intermittent [Inhaled Short-Acting Beta-2 Agonists] : inhaled short-acting beta-2 agonists [None] : No associated symptoms are reported [Good Tolerance] : good tolerance of treatment [Good Compliance] : good compliance with treatment [CT Scan] : a CT scan [Good Symptom Control] : good symptom control [On ___] : performed on [unfilled] [Patient] : the patient [Indication ___] : for an indication of [unfilled] [FreeTextEntry1] : The patient was suffering from cough, nasal congestion, wheeze, and SOB. CXR was without infiltrates. He was treated with abx and steroids but without significant improvement. He subsequently underwent chest CT and sinus CT which revealed sinusitis and bronchial thickening but without pneumonia. He was started on steroids and Breo now with near resolution of symptoms. He is concerned that his CPAP therapy may have contributed to his infection though he has been on CPAP for many years. \par He is now near baseline. [de-identified] : at 10 cm of water [FreeTextEntry5] : chest and sinus CT [FreeTextEntry8] : Bronchial thickening without consolidation or infiltrates; sinus thickening c/w sinusitis

## 2020-01-06 NOTE — CONSULT LETTER
[Consult Letter:] : I had the pleasure of evaluating your patient, [unfilled]. [Dear  ___] : Dear  [unfilled], [Consult Closing:] : Thank you very much for allowing me to participate in the care of this patient.  If you have any questions, please do not hesitate to contact me. [Please see my note below.] : Please see my note below. [Sincerely,] : Sincerely, [Clark Nevarez MD] : Clark Nevarez MD [DrKeyla  ___] : Dr. ROONEY [FreeTextEntry3] : Clark Nevarez MD, FCCP, D. ABSM\par Pulmonary and Sleep Medicine\par [facility] [place]

## 2020-01-06 NOTE — DISCUSSION/SUMMARY
[FreeTextEntry1] : \par #1. Completed Breo and now no longer on chronic BD therapy; symptomatically he is back to baseline; prior MCT was negative for asthma\par #2. ProAir as needed\par #3. Continue CPAP at 10 cm of water with new machine\par #4. Replace equipment as needed; ordered 1/6/20\par #5. F/u in 9 month with compliance \par #6. Repeat PFTs were essentially normal off of therapy\par #7. Consider repeat chest CT to reevaluate bronchial thickening but he would like to hold off for now given resolution of symptoms and normal PFTs\par #8. Uses SoClean

## 2020-01-06 NOTE — REASON FOR VISIT
[Asthma] : asthma [Follow-Up] : a follow-up visit [Cough] : cough [Shortness of Breath] : shortness of Breath [Sleep Apnea] : sleep apnea [FreeTextEntry2] : weight issues

## 2020-01-06 NOTE — PHYSICAL EXAM
[General Appearance - Well Developed] : well developed [General Appearance - In No Acute Distress] : no acute distress [Normal Appearance] : normal appearance [Low Lying Soft Palate] : low lying soft palate [Normal Conjunctiva] : the conjunctiva exhibited no abnormalities [Enlarged Base of the Tongue] : enlargement of the base of the tongue [Elongated Uvula] : elongated uvula [III] : III [Neck Appearance] : the appearance of the neck was normal [Heart Sounds] : normal S1 and S2 [Murmurs] : no murmurs present [Heart Rate And Rhythm] : heart rate and rhythm were normal [Edema] : no peripheral edema present [] : no respiratory distress [Respiration, Rhythm And Depth] : normal respiratory rhythm and effort [Exaggerated Use Of Accessory Muscles For Inspiration] : no accessory muscle use [Auscultation Breath Sounds / Voice Sounds] : lungs were clear to auscultation bilaterally [Abdomen Soft] : soft [Abdomen Tenderness] : non-tender [Abnormal Walk] : normal gait [Nail Clubbing] : no clubbing of the fingernails [No Focal Deficits] : no focal deficits [Cyanosis, Localized] : no localized cyanosis [Oriented To Time, Place, And Person] : oriented to person, place, and time [FreeTextEntry1] : No abnormalities

## 2020-01-09 ENCOUNTER — RX RENEWAL (OUTPATIENT)
Age: 75
End: 2020-01-09

## 2020-02-12 NOTE — ED PROVIDER NOTE - PHYSICAL EXAMINATION
tablet Take 200 mg by mouth daily       Multiple Vitamin (MULTI-VITAMINS PO) Take  by mouth.  escitalopram (LEXAPRO) 20 MG tablet Take 30 mg by mouth daily Take 1 in half tab daily to equal 30mg       No current facility-administered medications for this visit. Allergies: Adhesive tape  Past Medical History:   Diagnosis Date    Anxiety     Back pain     Depression     Hepatitis C     History of anemia     History of gallstones     History of kidney stones     Knee pain     left    Postpartum depression      Past Surgical History:   Procedure Laterality Date     SECTION      TONSILLECTOMY       Family History   Problem Relation Age of Onset    Cancer Sister         skin cancer, leukemia    Heart Disease Sister     Breast Cancer Sister     Celiac Disease Sister     Other Sister         lyme dz    Diabetes Mother     Thyroid Disease Mother     Lung Cancer Maternal Grandfather     Cancer Maternal Grandfather         throat cancer    Thyroid Disease Sister     Colon Cancer Neg Hx     Colon Polyps Neg Hx     Esophageal Cancer Neg Hx     Liver Cancer Neg Hx     Liver Disease Neg Hx     Rectal Cancer Neg Hx     Stomach Cancer Neg Hx      Social History     Tobacco Use    Smoking status: Former Smoker     Last attempt to quit: 2010     Years since quittin.7    Smokeless tobacco: Never Used   Substance Use Topics    Alcohol use: No       Old records obtained from the referring provider. These records have been reviewed and summarized. Review of Systems    Constitutional - no significant activity change, appetite change, or unexpected weight change. No fever or chills. No diaphoresis or significant fatigue. HENT - no significant rhinorrhea or epistaxis. No tinnitus or significant hearing loss. Eyes - no sudden vision change or amaurosis. Respiratory - no significant shortness of breath, wheezing, or stridor.   No apnea, cough, or chest tightness associated with shortness of breath. Cardiovascular - no chest pain, syncope, or significant dizziness. No palpitations. She reports leg swelling.  (see HPI). No claudication. Gastrointestinal - no abdominal swelling or pain. No blood in stool. No severe constipation, diarrhea, nausea, or vomiting. Genitourinary - No difficulty urinating, dysuria, frequency, or urgency. No flank pain or hematuria. Musculoskeletal - no back pain, gait disturbance, or myalgia. Skin - no color change, rash, pallor, or new wound. Neurologic - no dizziness, facial asymmetry, or light headedness. No seizures. No speech difficulty or lateralizing weakness. Hematologic - no easy bruising or excessive bleeding. Psychiatric - no severe anxiety or nervousness. No confusion. All other review of systems are negative. Physical Exam    /82 (Site: Right Upper Arm, Position: Sitting, Cuff Size: Large Adult)   Pulse 98   Temp 98.6 °F (37 °C) (Temporal)   Resp 18   Ht 5' 4\" (1.626 m)   Wt 185 lb (83.9 kg)   LMP 12/17/2019 (Approximate)   SpO2 98%   BMI 31.76 kg/m²     Constitutional - well developed, well nourished. No diaphoresis or acute distress. HENT - head normocephalic. Right external ear canal appears normal.  Left external ear canal appears normal.  Septum appears midline. Eyes - conjunctiva normal.  EOMS normal.  No exudate. No icterus. Neck- ROM appears normal, no tracheal deviation. Cardiovascular - Regular rate and rhythm. Heart sounds are normal.  No murmur, rub, or gallop. Carotid pulses are 2+ to palpation bilaterally without bruit. Extremities - Radial and brachial pulses are 2+ to palpation bilaterally. DP and PT pulses are palpable. No cyanosis or clubbing. No signs atheroembolic event. She has multiple, large, prominent varicosities mainly on the left leg. she has some spider veins bilaterally. Left leg appears worse than the right. Left leg swelling noted below the knee.  Pulmonary - effort appears normal.  No respiratory distress. Lungs - Breath sounds normal. No wheezes or rales. GI - Abdomen - soft, non tender, bowel sounds X 4 quadrants. No guarding or rebound tenderness. No distension or palpable mass. Genitourinary - deferred. Musculoskeletal - ROM appears normal.  Neurologic - alert and oriented X 3. Physiologic. Skin - warm, dry, and intact. No rash, erythema, or pallor. Psychiatric - mood, affect, and behavior appear normal.  Judgment and thought processes appear normal.    BLE venous scan for insufficiency - (reviewed by Dr. Reyna Coyne)  Impression        Right Side: The greater saphenous vein exhibits reflux lasting for a    maximum of 5479 milliseconds in thelow thigh. There is no evidence of deep    venous thrombosis in the segments insonated. There is no deep vein reflux.   Светлана Melissa is no short saphenous vein reflux.    Left Side: The greater saphenous vein exhibits reflux lasting for a    maximum of 6000 milliseconds in the low thigh. There is no evidence of    deep venous thrombosis in the segments insonated. There is no deep vein    reflux.    There is no short saphenous vein reflux.        Signature        ----------------------------------------------------------------    Electronically signed by Reyna Mishra(Interpreting    physician) on 01/16/2020 01:28 PM    ----------------------------------------------------------------       Velocities are measured in cm/s ; Diameters are measured in mm       Right Doppler Measurements   +----------------------------------------+------+------+-------------------+   ! Location                                !Signal!Reflux! Reflux (msec)      !   +----------------------------------------+------+------+-------------------+   ! Sapheno Femoral Junction                !      ! Yes   !1592               !   +----------------------------------------+------+------+-------------------+   ! GSV 2 cm Distal to Junction             !      ! Yes !PTV                                 ! Yes       ! Yes            !None      !   +------------------------------------+----------+---------------+----------+   ! GSV                                 ! Yes       ! Yes            !None      !   +------------------------------------+----------+---------------+----------+   ! ATV                                 ! Yes       ! Yes            !None      !   +------------------------------------+----------+---------------+----------+   ! Peroneal                            ! Yes       ! Yes            !None      !   +------------------------------------+----------+---------------+----------+       Left Lower Extremities DVT Study Measurements   Left 2D Measurements   +------------------------------------+----------+---------------+----------+   ! Location                            ! Visualized! Compressibility! Thrombosis! +------------------------------------+----------+---------------+----------+   ! Sapheno Femoral Junction            ! Yes       ! Yes            !None      !   +------------------------------------+----------+---------------+----------+   ! Common Femoral                      ! Yes       ! Yes            !None      !   +------------------------------------+----------+---------------+----------+   ! Prox Femoral                        ! Yes       ! Yes            !None      !   +------------------------------------+----------+---------------+----------+   ! Mid Femoral                         ! Yes       ! Yes            !None      !   +------------------------------------+----------+---------------+----------+   ! Dist Femoral                        ! Yes       ! Yes            !None      !   +------------------------------------+----------+---------------+----------+   ! Deep Femoral                        ! Yes       ! Yes            !None      !   +------------------------------------+----------+---------------+----------+   ! Popliteal                           ! Yes       ! Yes   neuro: CN II - XII intact, EOMI, PERRL, no papilledema, 5/5 muscle strength x 4 extremities, no sensory deficits, 2+ dtr globally, negative babinski, no ataxic gait, normal SARA and FNT, normal romberg

## 2020-04-02 ENCOUNTER — RX RENEWAL (OUTPATIENT)
Age: 75
End: 2020-04-02

## 2020-05-18 ENCOUNTER — APPOINTMENT (OUTPATIENT)
Dept: DERMATOLOGY | Facility: CLINIC | Age: 75
End: 2020-05-18
Payer: MEDICARE

## 2020-05-18 PROCEDURE — 99213 OFFICE O/P EST LOW 20 MIN: CPT | Mod: 95

## 2020-06-12 ENCOUNTER — APPOINTMENT (OUTPATIENT)
Dept: DERMATOLOGY | Facility: CLINIC | Age: 75
End: 2020-06-12
Payer: MEDICARE

## 2020-06-12 PROCEDURE — 17000 DESTRUCT PREMALG LESION: CPT

## 2020-06-12 PROCEDURE — 99214 OFFICE O/P EST MOD 30 MIN: CPT | Mod: 25

## 2020-06-18 ENCOUNTER — APPOINTMENT (OUTPATIENT)
Dept: DERMATOLOGY | Facility: CLINIC | Age: 75
End: 2020-06-18

## 2020-08-11 ENCOUNTER — APPOINTMENT (OUTPATIENT)
Dept: DERMATOLOGY | Facility: CLINIC | Age: 75
End: 2020-08-11

## 2020-09-01 ENCOUNTER — APPOINTMENT (OUTPATIENT)
Dept: CARDIOLOGY | Facility: CLINIC | Age: 75
End: 2020-09-01
Payer: MEDICARE

## 2020-09-01 ENCOUNTER — NON-APPOINTMENT (OUTPATIENT)
Age: 75
End: 2020-09-01

## 2020-09-01 VITALS
OXYGEN SATURATION: 98 % | HEART RATE: 64 BPM | WEIGHT: 204 LBS | TEMPERATURE: 98.5 F | RESPIRATION RATE: 16 BRPM | BODY MASS INDEX: 25.36 KG/M2 | DIASTOLIC BLOOD PRESSURE: 70 MMHG | HEIGHT: 75 IN | SYSTOLIC BLOOD PRESSURE: 130 MMHG

## 2020-09-01 PROCEDURE — 99214 OFFICE O/P EST MOD 30 MIN: CPT

## 2020-09-01 PROCEDURE — 93000 ELECTROCARDIOGRAM COMPLETE: CPT

## 2020-09-01 NOTE — ASSESSMENT
[FreeTextEntry1] : The ECG shows sinus rhythm at 65 bpm otherwise WNL\par \par Laboratory data 7/30/20\par TChol 163\par HDL 53\par LDL 91 \par triglycerides 91\par \par Lab data  4/10/19\par Chol 141\par LDL   76\par HDL   50\par Tri.    69\par A1C 5.1\par LFTS WNL\par Creat 0.89\par \par Stress echo 9/20/18:\par Patient exercised 10 minutes 30 seconds (11 Mets).\par Peak heart rate 139 (93% maximum).\par Blood pressure began high but response is normal\par ECG showed no ischemic changes\par Baseline left ventricular ejection fraction 60-65% augmenting appropriately to 70-75% with exercise without any wall motion denies. \par Test is considered negative for ischemia.\par \par mpression:\par 1.  The patient has  mild exertional dyspnea.Based on the results of the stress testing, he seems to have reasonably good exercise tolerance without any evidence of exercise-induced ischemia. Do not believe that there is a cardiac etiology to the shortness of breath.\par \par 2.  Blood pressure seems to be controlled with Cartia XT\par \par 3.  Lipid control is adequate.If in fact he is found to have carotid atherosclerotic disease, would like to see the LDL lower.\par \par 4. MARILUZ is being consistently treated\par \par Recommendations:\par 1.  Home blood pressure monitoring to continue\par 2.  Reducing sodium intake.  This will be especially important when he travels\par 3. Blood work to be checked every 6 months moving forward.\par 4. No aspirin due to history of esophageal ulcers\par 5. Carotid Doppler to be done shortly and then reassess management of lipids.\par Clinical follow up in 6 months

## 2020-09-01 NOTE — REASON FOR VISIT
[FreeTextEntry1] : This is a 75 -year-old male who presents here for cardiac reevaluation re:\par \par - treatment of his HTN\par - some exertional shortness of breath. \par - HLD\par - MARILUZ on CPAP\par - reported Hx Of Carotid atherosclerosis\par \par  He denies chest pain or palpitations.  No paroxysmal nocturnal dyspnea, orthopnea, or edema. \par \par   His history includes: \par 1.  A significant family history of premature coronary artery disease. \par  2.  A history of hyperlipidemia.\par 3.  Hypertension with  left ventricular hypertrophy and biatrial enlargement. \par \par \par Patient denies any chest pain, shortness of breath, palpitations, PND, orthopnea or edema\par \par Some mild SAVAGE during a recent trip to [place] [employer] [place] [employer] while climbing stairs otherwise no exertional discomfort.\par \par  States blood pressures have been controlled and continues to use his CPAP nightly 100% compliant\par decreased exercise lately\par   He continues to take his meds.

## 2020-09-01 NOTE — HISTORY OF PRESENT ILLNESS
[FreeTextEntry1] : Was begun on lisinopril 10 mg p.o. daily  11/18\par He subsequently developed a dry cough and this was stopped.\par Irbesartan 150 mg daily was begun November 26/18. This has been better tolerated but changed to telmisartan bec of shortages.\par This was stopped October 2019 because of hyperkalemia (5.7) and Cartia  mg was started.\par \par History of esophageal ulcers and therefore is not taking Aspirin.

## 2020-10-05 ENCOUNTER — APPOINTMENT (OUTPATIENT)
Dept: PULMONOLOGY | Facility: CLINIC | Age: 75
End: 2020-10-05
Payer: MEDICARE

## 2020-10-05 VITALS
OXYGEN SATURATION: 97 % | WEIGHT: 202 LBS | BODY MASS INDEX: 25.93 KG/M2 | DIASTOLIC BLOOD PRESSURE: 70 MMHG | SYSTOLIC BLOOD PRESSURE: 110 MMHG | RESPIRATION RATE: 14 BRPM | HEART RATE: 61 BPM | HEIGHT: 74 IN

## 2020-10-05 PROCEDURE — 99214 OFFICE O/P EST MOD 30 MIN: CPT

## 2020-10-05 NOTE — CONSULT LETTER
[Dear  ___] : Dear  [unfilled], [Consult Letter:] : I had the pleasure of evaluating your patient, [unfilled]. [Please see my note below.] : Please see my note below. [Consult Closing:] : Thank you very much for allowing me to participate in the care of this patient.  If you have any questions, please do not hesitate to contact me. [Sincerely,] : Sincerely, [DrKeyla  ___] : Dr. ROONEY [Clark Nevarez MD] : Clark Nevarez MD [FreeTextEntry3] : Clark Nevarez MD, FCCP, D. ABSM\par Pulmonary and Sleep Medicine\par [facility] [place]

## 2020-10-05 NOTE — HISTORY OF PRESENT ILLNESS
[CPAP] : CPAP [Follow-Up - Routine Clinic] : a routine clinic follow-up of [Intermittent] : intermittent [None] : No associated symptoms are reported [Inhaled Short-Acting Beta-2 Agonists] : inhaled short-acting beta-2 agonists [Good Compliance] : good compliance with treatment [Good Tolerance] : good tolerance of treatment [Good Symptom Control] : good symptom control [CT Scan] : a CT scan [On ___] : performed on [unfilled] [Patient] : the patient [Indication ___] : for an indication of [unfilled] [FreeTextEntry1] : The patient was suffering from cough, nasal congestion, wheeze, and SOB. CXR was without infiltrates. He was treated with abx and steroids but without significant improvement. He subsequently underwent chest CT and sinus CT which revealed sinusitis and bronchial thickening but without pneumonia. He was started on steroids and Breo now with near resolution of symptoms. He is concerned that his CPAP therapy may have contributed to his infection though he has been on CPAP for many years. \par He is now near baseline. [de-identified] : at 10 cm of water [FreeTextEntry5] : chest and sinus CT [FreeTextEntry8] : Bronchial thickening without consolidation or infiltrates; sinus thickening c/w sinusitis

## 2020-10-05 NOTE — REASON FOR VISIT
[Follow-Up] : a follow-up visit [Asthma] : asthma [Cough] : cough [Shortness of Breath] : shortness of Breath [Sleep Apnea] : sleep apnea [FreeTextEntry2] : weight issues

## 2020-10-05 NOTE — REVIEW OF SYSTEMS
[As Noted in HPI] : as noted in HPI [Fever] : no fever [Chills] : no chills [Dry Eyes] : no dryness of the eyes [Eye Irritation] : no ~T irritation of the eyes [Nasal Congestion] : no nasal congestion [Epistaxis] : no nosebleeds [Postnasal Drip] : no postnasal drip [Sinus Problems] : no sinus problems [Cough] : no cough [Sputum] : not coughing up ~M sputum [Hemoptysis] : no hemoptysis [Dyspnea] : no dyspnea [Chest Tightness] : no chest tightness [Pleuritic Pain] : no pleuritic pain [Wheezing] : no wheezing [Hypertension] : no ~T hypertension [Chest Discomfort] : no chest discomfort [Dysrhythmia] : no dysrhythmia [Murmurs] : no murmurs were heard [Palpitations] : no palpitations [Edema] : ~T edema was not present [Hay Fever] : no hay fever [Itchy Eyes] : no itching of ~T the eyes [Reflux] : no reflux [Nausea] : no nausea [Vomiting] : no vomiting [Constipation] : no constipation [Diarrhea] : no diarrhea [Abdominal Pain] : no abdominal pain [Dysuria] : no dysuria [Trauma] : no ~T physical trauma [Fracture] : no fracture [Anemia] : no anemia [Headache] : no headache [Dizziness] : no dizziness [Syncope] : no fainting [Numbness] : no numbness [Paralysis] : no paralysis was seen [Seizures] : no seizures [Depression] : no depression [Anxiety] : no anxiety [Diabetes] : no diabetes mellitus [Thyroid Problem] : no thyroid problem

## 2020-10-05 NOTE — DISCUSSION/SUMMARY
[FreeTextEntry1] : \par #1. Completed Breo and now no longer on chronic BD therapy; symptomatically he is back to baseline; prior MCT was negative for asthma\par #2. ProAir as needed\par #3. Continue CPAP at 10 cm of water with new machine\par #4. Replace equipment as needed; ordered 10/5/20\par #5. F/u in 9 month with compliance \par #6. Repeat PFTs were essentially normal off of therapy\par #7. Consider repeat chest CT to reevaluate bronchial thickening but he would like to hold off for now given resolution of symptoms and normal PFTs\par #8. Uses SoClean\par #9. Reviewed risks of exposure and symptoms of Covid-19 virus, including how the virus is spread.\par \par Discussed the following risk-reducing strategies:\par -Wash hands with soap and water (proper technique reviewed) \par -Use alcohol based  when hand-washing is not an option\par -Maintain a social distance of at least 6 feet whenever possible\par -Avoid contact, especially hand shaking\par -Avoid touching eyes, nose, and mouth\par -Cover face/mouth when coughing or sneezing\par -Avoid close contact with sick people\par -Seek medical help if you develop a fever and/or respiratory symptoms (e.g. cough, chest pain, SOB)\par \par Patient's questions were answered and patient appears to understand these recommendations\par

## 2020-12-04 ENCOUNTER — APPOINTMENT (OUTPATIENT)
Dept: CARDIOLOGY | Facility: CLINIC | Age: 75
End: 2020-12-04
Payer: MEDICARE

## 2020-12-04 PROCEDURE — 93880 EXTRACRANIAL BILAT STUDY: CPT

## 2020-12-04 PROCEDURE — 99072 ADDL SUPL MATRL&STAF TM PHE: CPT

## 2020-12-15 ENCOUNTER — APPOINTMENT (OUTPATIENT)
Dept: DERMATOLOGY | Facility: CLINIC | Age: 75
End: 2020-12-15
Payer: MEDICARE

## 2020-12-15 PROCEDURE — 99072 ADDL SUPL MATRL&STAF TM PHE: CPT

## 2020-12-15 PROCEDURE — 99214 OFFICE O/P EST MOD 30 MIN: CPT

## 2021-02-01 NOTE — ED ADULT NURSE NOTE - CHPI ED SYMPTOMS NEG
English no chills/no back pain/no fever/no shortness of breath/no vomiting/no nausea/no cough/no syncope

## 2021-02-11 ENCOUNTER — RX RENEWAL (OUTPATIENT)
Age: 76
End: 2021-02-11

## 2021-03-19 ENCOUNTER — APPOINTMENT (OUTPATIENT)
Dept: CARDIOLOGY | Facility: CLINIC | Age: 76
End: 2021-03-19
Payer: MEDICARE

## 2021-03-19 VITALS
WEIGHT: 200 LBS | HEART RATE: 62 BPM | OXYGEN SATURATION: 97 % | RESPIRATION RATE: 16 BRPM | SYSTOLIC BLOOD PRESSURE: 148 MMHG | DIASTOLIC BLOOD PRESSURE: 70 MMHG | HEIGHT: 74 IN | BODY MASS INDEX: 25.67 KG/M2 | TEMPERATURE: 98.2 F

## 2021-03-19 PROCEDURE — 99214 OFFICE O/P EST MOD 30 MIN: CPT

## 2021-03-19 PROCEDURE — 99072 ADDL SUPL MATRL&STAF TM PHE: CPT

## 2021-03-19 PROCEDURE — 93000 ELECTROCARDIOGRAM COMPLETE: CPT

## 2021-03-19 NOTE — ASSESSMENT
[FreeTextEntry1] : The ECG shows sinus rhythm at 62  bpm otherwise WNL\par \par Laboratory data \par       7/30/20 1/21/2021\par Chol 163            135\par HDL   53              45\par LDL    91             77\par trigl     91            65\par \par Lab data  4/10/19\par Chol 141\par LDL   76\par HDL   50\par Tri.    69\par A1C 5.1\par LFTS WNL\par Creat 0.89\par \par Carotid duplex 12/4/2020:\par Left carotid: Minimal atherosclerotic plaque\par Right carotid mild plaquing 1 to 49% stenosis\par \par Stress echo 9/20/18:\par Patient exercised 10 minutes 30 seconds (11 Mets).\par Peak heart rate 139 (93% maximum).\par Blood pressure began high but response is normal\par ECG showed no ischemic changes\par Baseline left ventricular ejection fraction 60-65% augmenting appropriately to 70-75% with exercise without any wall motion denies. \par Test is considered negative for ischemia.\par \par Impression:\par 1.  Symptoms of shortness of breath are less concerning to him at this time.\par Last stress test approximately 2 and half years ago showing pretty good functional capacity and no evidence of ischemia.\par \par 2.  Blood pressure seems to be elevated with Cartia XT, however, uncertain whether this reflects what he is running at home\par \par 3.  Lipid control is improved.  Based on this and the very mild carotid disease, no indication for changing current lipid management\par \par 4. MARILUZ is being consistently treated\par \par Recommendations:\par 1.  Home blood pressure monitoring to continue.  Patient to call in 2 weeks\par 2.  Reducing sodium intake.  This will be especially important when he travels\par 3. Blood work to be checked every 6 months moving forward.\par 4. No aspirin due to history of esophageal ulcers\par 5.  Consideration of a follow-up echocardiogram later this year.\par Clinical follow up in 6 months

## 2021-03-19 NOTE — REASON FOR VISIT
[FreeTextEntry1] : This is a 75 -year-old male who presents here for cardiac reevaluation re:\par \par - treatment of his HTN\par - some exertional shortness of breath. \par - HLD\par - MARILUZ on CPAP\par - reported Hx Of Carotid atherosclerosis\par \par  He denies chest pain or palpitations.  No paroxysmal nocturnal dyspnea, orthopnea, or edema. \par \par   His history includes: \par 1.  A significant family history of premature coronary artery disease. \par  2.  A history of hyperlipidemia.\par 3.  Hypertension with  left ventricular hypertrophy and biatrial enlargement. \par \par \par Patient denies any chest pain, shortness of breath, palpitations, PND, orthopnea or edema\par \par Some mild SAVAGE during a recent trip to [place] while climbing stairs otherwise no exertional discomfort.\par \par  He continues to use his CPAP nightly 100% compliant\par Uncertain of his blood pressure.  Not really tracking it at home.\par decreased exercise lately\par  He continues to take his meds.

## 2021-04-12 ENCOUNTER — NON-APPOINTMENT (OUTPATIENT)
Age: 76
End: 2021-04-12

## 2021-06-04 DIAGNOSIS — Z01.818 ENCOUNTER FOR OTHER PREPROCEDURAL EXAMINATION: ICD-10-CM

## 2021-06-07 ENCOUNTER — APPOINTMENT (OUTPATIENT)
Dept: DISASTER EMERGENCY | Facility: CLINIC | Age: 76
End: 2021-06-07

## 2021-06-08 LAB — SARS-COV-2 N GENE NPH QL NAA+PROBE: NOT DETECTED

## 2021-06-11 ENCOUNTER — APPOINTMENT (OUTPATIENT)
Dept: PULMONOLOGY | Facility: CLINIC | Age: 76
End: 2021-06-11
Payer: MEDICARE

## 2021-06-11 VITALS — HEART RATE: 66 BPM | DIASTOLIC BLOOD PRESSURE: 60 MMHG | OXYGEN SATURATION: 97 % | SYSTOLIC BLOOD PRESSURE: 130 MMHG

## 2021-06-11 VITALS — WEIGHT: 190 LBS | BODY MASS INDEX: 24.38 KG/M2 | TEMPERATURE: 98.2 F | HEIGHT: 74 IN

## 2021-06-11 PROCEDURE — 85018 HEMOGLOBIN: CPT | Mod: QW

## 2021-06-11 PROCEDURE — 99214 OFFICE O/P EST MOD 30 MIN: CPT | Mod: 25

## 2021-06-11 PROCEDURE — 99072 ADDL SUPL MATRL&STAF TM PHE: CPT

## 2021-06-11 PROCEDURE — 94010 BREATHING CAPACITY TEST: CPT

## 2021-06-11 PROCEDURE — 94729 DIFFUSING CAPACITY: CPT

## 2021-06-11 PROCEDURE — 94727 GAS DIL/WSHOT DETER LNG VOL: CPT

## 2021-06-11 NOTE — PROCEDURE
[FreeTextEntry1] : PFTs 7/20/18 - essentially normal.\par Spirometry 1/6/20 - essentially normal \par PFTs 6/11/21 - normal

## 2021-06-11 NOTE — REASON FOR VISIT
[Asthma] : asthma [Shortness of Breath] : shortness of breath [Follow-Up] : a follow-up visit [TextBox_44] : overweight

## 2021-06-11 NOTE — HISTORY OF PRESENT ILLNESS
[CPAP] : CPAP [Follow-Up - Routine Clinic] : a routine clinic follow-up of [Intermittent] : intermittent [None] : No associated symptoms are reported [Inhaled Short-Acting Beta-2 Agonists] : inhaled short-acting beta-2 agonists [Good Compliance] : good compliance with treatment [Good Tolerance] : good tolerance of treatment [Good Symptom Control] : good symptom control [CT Scan] : a CT scan [On ___] : performed on [unfilled] [Patient] : the patient [Indication ___] : for an indication of [unfilled] [FreeTextEntry1] : The patient was suffering from cough, nasal congestion, wheeze, and SOB. CXR was without infiltrates. He was treated with abx and steroids but without significant improvement. He subsequently underwent chest CT and sinus CT which revealed sinusitis and bronchial thickening but without pneumonia. He was started on steroids and Breo now with near resolution of symptoms. He is concerned that his CPAP therapy may have contributed to his infection though he has been on CPAP for many years. \par He is now near baseline. [de-identified] : at 10 cm of water [FreeTextEntry5] : chest and sinus CT [FreeTextEntry8] : Bronchial thickening without consolidation or infiltrates; sinus thickening c/w sinusitis

## 2021-06-11 NOTE — CONSULT LETTER
[Dear  ___] : Dear  [unfilled], [Consult Letter:] : I had the pleasure of evaluating your patient, [unfilled]. [Please see my note below.] : Please see my note below. [Consult Closing:] : Thank you very much for allowing me to participate in the care of this patient.  If you have any questions, please do not hesitate to contact me. [Sincerely,] : Sincerely, [DrKeyla  ___] : Dr. ROONEY [FreeTextEntry3] : Clark Nevarez MD, FCCP, D. ABSM\par Pulmonary and Sleep Medicine\par [facility] at [place]

## 2021-06-11 NOTE — DISCUSSION/SUMMARY
[FreeTextEntry1] : \par #1. Completed Breo and now no longer on chronic BD therapy; symptomatically he is back to baseline; prior MCT was negative for asthma\par #2. ProAir as needed\par #3. Continue CPAP at 10 cm of water with new machine\par #4. Replace equipment as needed; ordered 10/5/20\par #5. F/u in 9 month with compliance \par #6. Repeat PFTs were essentially normal off of therapy; monitor as needed\par #7. Consider repeat chest CT to reevaluate bronchial thickening but he would like to hold off for now given resolution of symptoms and normal PFTs\par #8. Uses SoClean\par #9. Pt had both Covid vaccines\par #10. Reviewed risks of exposure and symptoms of Covid-19 virus, including how the virus is spread and precautions to avoid laura virus.\par \par Patient's questions were answered and patient appears to understand these recommendations \par

## 2021-06-21 ENCOUNTER — APPOINTMENT (OUTPATIENT)
Dept: DERMATOLOGY | Facility: CLINIC | Age: 76
End: 2021-06-21
Payer: MEDICARE

## 2021-06-21 PROCEDURE — 99072 ADDL SUPL MATRL&STAF TM PHE: CPT

## 2021-06-21 PROCEDURE — 99213 OFFICE O/P EST LOW 20 MIN: CPT | Mod: 25

## 2021-06-21 PROCEDURE — 17000 DESTRUCT PREMALG LESION: CPT

## 2021-08-30 ENCOUNTER — APPOINTMENT (OUTPATIENT)
Dept: CARDIOLOGY | Facility: CLINIC | Age: 76
End: 2021-08-30
Payer: MEDICARE

## 2021-08-30 VITALS
DIASTOLIC BLOOD PRESSURE: 60 MMHG | WEIGHT: 200 LBS | RESPIRATION RATE: 16 BRPM | OXYGEN SATURATION: 98 % | HEART RATE: 54 BPM | BODY MASS INDEX: 25.67 KG/M2 | HEIGHT: 74 IN | SYSTOLIC BLOOD PRESSURE: 125 MMHG

## 2021-08-30 PROCEDURE — 93000 ELECTROCARDIOGRAM COMPLETE: CPT

## 2021-08-30 PROCEDURE — 99214 OFFICE O/P EST MOD 30 MIN: CPT

## 2021-08-30 RX ORDER — TROSPIUM CHLORIDE 60 MG/1
60 CAPSULE, EXTENDED RELEASE ORAL
Qty: 90 | Refills: 0 | Status: DISCONTINUED | COMMUNITY
Start: 2019-03-07 | End: 2021-08-30

## 2021-08-30 NOTE — ASSESSMENT
[FreeTextEntry1] : ECG: Sinus bradycardia 54 bpm.  Otherwise within normal limits.\par \par Laboratory data \par       7/30/20 1/21/2021 8/9/21\par Chol 163            135           138\par HDL   53              45             54\par LDL    91             77             73\par trigl     91            65             53\par \par Lab data  4/10/19\par Chol 141\par LDL   76\par HDL   50\par Tri.    69\par A1C 5.1\par LFTS WNL\par Creat 0.89\par \par Carotid duplex 12/4/2020:\par Left carotid: Minimal atherosclerotic plaque\par Right carotid mild plaquing 1 to 49% stenosis\par \par Stress echo 9/20/18:\par Patient exercised 10 minutes 30 seconds (11 Mets).\par Peak heart rate 139 (93% maximum).\par Blood pressure began high but response is normal\par ECG showed no ischemic changes\par Baseline left ventricular ejection fraction 60-65% augmenting appropriately to 70-75% with exercise without any wall motion denies. \par Test is considered negative for ischemia.\par \par Impression:\par 1.  Symptoms of shortness of breath are less concerning to him at this time.\par     Last stress test  9/20/18 showing pretty good functional capacity and no evidence of ischemia.\par \par 2.  Blood pressure control is less than optimal.\par \par 3.  Lipid control is improved.  Based on this and the very mild carotid disease, no indication for changing current lipid management\par \par 4. MARILUZ is being consistently treated\par \par Recommendations:\par 1.  Ample room for improvement in sodium restriction and exercise, both of which may improve the systolic blood pressure.\par    (Reluctant to add ACE inhibitor because of cough; ARB because of hyperkalemia; beta-blocker because of bradycardia; diuretic because of urinary frequency)\par 2.  Reducing sodium intake.  This will be especially important when he travels\par 3. Blood work to be checked every 6 months moving forward.\par 4. No aspirin due to history of esophageal ulcers\par 5.  Consideration of a follow-up echocardiogram later this year.\par Clinical follow up in 6 months

## 2021-09-22 ENCOUNTER — NON-APPOINTMENT (OUTPATIENT)
Age: 76
End: 2021-09-22

## 2021-09-24 ENCOUNTER — RX RENEWAL (OUTPATIENT)
Age: 76
End: 2021-09-24

## 2021-10-29 ENCOUNTER — RX RENEWAL (OUTPATIENT)
Age: 76
End: 2021-10-29

## 2021-11-19 NOTE — REASON FOR VISIT
none
[FreeTextEntry1] : This is a 76  -year-old male who presents here for cardiac reevaluation re:\par \par - treatment of his HTN\par - some exertional shortness of breath. \par - HLD\par - MARILUZ on CPAP\par - reported Hx Of Carotid atherosclerosis\par \par  He denies chest pain or palpitations.  No paroxysmal nocturnal dyspnea, orthopnea, or edema. \par \par   His history includes: \par 1.  A significant family history of premature coronary artery disease. \par  2.  A history of hyperlipidemia.\par 3.  Hypertension with  left ventricular hypertrophy and biatrial enlargement. \par \par Patient denies any chest pain, shortness of breath, palpitations, PND, orthopnea or edema\par \par Some mild SAVAGE during a recent trip to [email or web address] while climbing stairs otherwise no exertional discomfort.\par \par  He continues to use his CPAP nightly 100% compliant\par BP record average 135/72\par Minimal amount of exercise.  \par Not terribly compliant with sodium restriction.  \par He continues to take his meds.

## 2021-12-21 ENCOUNTER — APPOINTMENT (OUTPATIENT)
Dept: DERMATOLOGY | Facility: CLINIC | Age: 76
End: 2021-12-21
Payer: MEDICARE

## 2021-12-21 PROCEDURE — 99213 OFFICE O/P EST LOW 20 MIN: CPT

## 2021-12-28 NOTE — ED PROVIDER NOTE - CARDIOVASCULAR [+], MLM
CHEST PAIN Dapsone Pregnancy And Lactation Text: This medication is Pregnancy Category C and is not considered safe during pregnancy or breast feeding.

## 2022-01-04 ENCOUNTER — APPOINTMENT (OUTPATIENT)
Dept: PULMONOLOGY | Facility: CLINIC | Age: 77
End: 2022-01-04

## 2022-02-03 ENCOUNTER — APPOINTMENT (OUTPATIENT)
Dept: PULMONOLOGY | Facility: CLINIC | Age: 77
End: 2022-02-03
Payer: COMMERCIAL

## 2022-02-03 PROCEDURE — 94010 BREATHING CAPACITY TEST: CPT

## 2022-02-03 PROCEDURE — 94729 DIFFUSING CAPACITY: CPT

## 2022-02-03 PROCEDURE — 85018 HEMOGLOBIN: CPT | Mod: QW

## 2022-02-03 PROCEDURE — 94727 GAS DIL/WSHOT DETER LNG VOL: CPT

## 2022-02-05 ENCOUNTER — TRANSCRIPTION ENCOUNTER (OUTPATIENT)
Age: 77
End: 2022-02-05

## 2022-02-07 ENCOUNTER — NON-APPOINTMENT (OUTPATIENT)
Age: 77
End: 2022-02-07

## 2022-02-08 ENCOUNTER — APPOINTMENT (OUTPATIENT)
Dept: PULMONOLOGY | Facility: CLINIC | Age: 77
End: 2022-02-08
Payer: COMMERCIAL

## 2022-02-08 VITALS
HEART RATE: 76 BPM | BODY MASS INDEX: 25.68 KG/M2 | SYSTOLIC BLOOD PRESSURE: 130 MMHG | WEIGHT: 200 LBS | DIASTOLIC BLOOD PRESSURE: 78 MMHG | OXYGEN SATURATION: 98 % | RESPIRATION RATE: 16 BRPM

## 2022-02-08 DIAGNOSIS — Z23 ENCOUNTER FOR IMMUNIZATION: ICD-10-CM

## 2022-02-08 PROCEDURE — 99215 OFFICE O/P EST HI 40 MIN: CPT

## 2022-02-08 NOTE — HISTORY OF PRESENT ILLNESS
[CPAP] : CPAP [Follow-Up - Routine Clinic] : a routine clinic follow-up of [Intermittent] : intermittent [None] : No associated symptoms are reported [Inhaled Short-Acting Beta-2 Agonists] : inhaled short-acting beta-2 agonists [Good Compliance] : good compliance with treatment [Good Tolerance] : good tolerance of treatment [Good Symptom Control] : good symptom control [CT Scan] : a CT scan [On ___] : performed on [unfilled] [Patient] : the patient [Indication ___] : for an indication of [unfilled] [FreeTextEntry1] : The patient was suffering from cough, nasal congestion, wheeze, and SOB. CXR was without infiltrates. He was treated with abx and steroids but without significant improvement. He subsequently underwent chest CT and sinus CT which revealed sinusitis and bronchial thickening but without pneumonia. He was started on steroids and Breo now with near resolution of symptoms. He is concerned that his CPAP therapy may have contributed to his infection though he has been on CPAP for many years. \par \par Since Oct 2020 pt with intermittent cough and has had evaluations with allergist, ENT, and another pulm. Pt was treated with Symbicort, doxycycline, Zyrtec, PPI, Tessalon Perles. Covid swabs and w/u with chest CT and CXR were unremarkable.\par  [de-identified] : at 10 cm of water [FreeTextEntry5] : chest and sinus CT [FreeTextEntry8] : Bronchial thickening without consolidation or infiltrates; sinus thickening c/w sinusitis

## 2022-02-08 NOTE — CONSULT LETTER
[Dear  ___] : Dear  [unfilled], [Consult Letter:] : I had the pleasure of evaluating your patient, [unfilled]. [Please see my note below.] : Please see my note below. [Consult Closing:] : Thank you very much for allowing me to participate in the care of this patient.  If you have any questions, please do not hesitate to contact me. [Sincerely,] : Sincerely, [DrKeyla  ___] : Dr. ROONEY [FreeTextEntry3] : Clark Nevarez MD, FCCP, D. ABSM\par Pulmonary and Sleep Medicine\par [facility] [place] [DrKeyla ___] : Dr. ROONEY

## 2022-02-08 NOTE — REASON FOR VISIT
[Follow-Up] : a follow-up visit [Asthma] : asthma [Shortness of Breath] : shortness of breath [TextBox_44] : overweight

## 2022-02-08 NOTE — DISCUSSION/SUMMARY
[FreeTextEntry1] : \par #1. Completed Breo previously and Symbicort recently and now no longer on chronic BD therapy; symptomatically he is back to baseline; prior MCT was negative for asthma\par #2. ProAir as needed\par #3. Continue CPAP at 10 cm of water with new machine\par #4. Replace equipment as needed; ordered 2/8/22\par #5. F/u in 9 month with compliance \par #6. Repeat PFTs were essentially normal off of therapy; monitor as needed\par #7. Consider repeat chest CT to reevaluate bronchial thickening but he would like to hold off for now given resolution of symptoms and normal PFTs\par #8. Uses SoClean\par #9. Pt had both Covid vaccines, booster, and flu vaccines\par #10. Follows with ENT and is on Flonase/Astelin\par #11. Follow with AI and was on Zyrtec but no longer\par #12. Pt on PPI for GERD\par #13. F/u in 3 months with spirometry\par #14. Reviewed risks of exposure and symptoms of Covid-19 virus, including how the virus is spread and precautions to avoid laura virus.\par \par The patient expressed understanding and agreement with the above recommendations/plan and accepts responsibility to be compliant with recommended testing, therapies, and f/u visits.\par All relevant questions and concerns were addressed.

## 2022-03-07 ENCOUNTER — APPOINTMENT (OUTPATIENT)
Dept: CARDIOLOGY | Facility: CLINIC | Age: 77
End: 2022-03-07
Payer: MEDICARE

## 2022-03-07 VITALS
BODY MASS INDEX: 26.31 KG/M2 | RESPIRATION RATE: 16 BRPM | HEART RATE: 62 BPM | DIASTOLIC BLOOD PRESSURE: 80 MMHG | HEIGHT: 74 IN | OXYGEN SATURATION: 98 % | SYSTOLIC BLOOD PRESSURE: 150 MMHG | WEIGHT: 205 LBS

## 2022-03-07 PROCEDURE — 99214 OFFICE O/P EST MOD 30 MIN: CPT

## 2022-03-07 PROCEDURE — 93000 ELECTROCARDIOGRAM COMPLETE: CPT

## 2022-03-07 RX ORDER — FLUTICASONE PROPIONATE 50 UG/1
50 SPRAY, METERED NASAL
Qty: 16 | Refills: 0 | Status: ACTIVE | COMMUNITY
Start: 2022-01-26

## 2022-03-07 RX ORDER — AZELASTINE HYDROCHLORIDE 137 UG/1
0.1 SPRAY, METERED NASAL
Qty: 30 | Refills: 0 | Status: ACTIVE | COMMUNITY
Start: 2022-01-26

## 2022-03-07 NOTE — REASON FOR VISIT
[FreeTextEntry1] : This is a 76  -year-old male who presents here for cardiac reevaluation re:\par \par -  HTN\par - exertional shortness of breath. \par - HLD\par - MARILUZ on CPAP\par - Hx Of Carotid atherosclerosis\par \par \par \par   His history includes: \par 1.  A significant family history of premature coronary artery disease. \par  2.  A history of hyperlipidemia.\par 3.  Hypertension with  left ventricular hypertrophy and biatrial enlargement. \par \par Patient denies any chest pain, palpitations, PND, orthopnea or edema\par Recently has noted increasing exertional dyspnea with climbing any hill or elevation.\par \par He continues to use his CPAP nightly 100% compliant. Saw pulm. and receiving a new machine. PFTS WNL\par \par Believes his BPs at home run 10 point slower in both systolic and diastolics.  Today he provided only 4 home readings.  Avg.= 139/71 HR 59 BPM\par \par Minimal amount of exercise but notices some mild SAVAGE with walking on [place] terrain, almost mentioned this during his last trip to [employer] ( climbing stairs). 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 \par \par Does have chronic sinus inflammation which he believes may be attributing to his dyspnea. \par \par Recent increase stress with the birth a complicated pregnancy/delivery of his grandchild. \par He continues to take his meds.

## 2022-03-07 NOTE — HISTORY OF PRESENT ILLNESS
[FreeTextEntry1] : Was begun on lisinopril 10 mg p.o. daily  11/18\par He subsequently developed a dry cough and this was stopped.\par \par Irbesartan 150 mg daily was begun November 26/18. This has been better tolerated but changed to telmisartan bec of shortages.\par This was stopped October 2019 because of hyperkalemia (5.7) and Cartia  mg was started.\par \par History of esophageal ulcers and therefore is not taking Aspirin.

## 2022-03-07 NOTE — ASSESSMENT
[FreeTextEntry1] : ECG: Normal sinus rhythm at 62 bpm.  Normal axis intervals.  No significant ST-T wave changes.\par \par Laboratory data \par -----7/30/20---1/21/21---8/9/21---3/3/22\par Chol---163------135------138------153\par HDL----53--------45--------54-------53\par LDL-----91-------77--------73-------91\par trig------91-------65--------53-------46\par \par \par Carotid duplex 12/4/2020:\par Left carotid: Minimal atherosclerotic plaque\par Right carotid mild plaquing 1 to 49% stenosis\par \par Stress echo 9/20/18:\par Patient exercised 10 minutes 30 seconds (11 Mets).\par Peak heart rate 139 (93% maximum).\par Blood pressure began high but response is normal\par ECG showed no ischemic changes\par Baseline left ventricular ejection fraction 60-65% augmenting appropriately to 70-75% with exercise without any wall motion denies. \par Test is considered negative for ischemia.\par \par Impression:\par 1.  Exertional shortness of breath which seems to be increased in comparison to prior\par      He has gained 5 lbs and admits that he has been a little sedentary which may attributing to his dyspnea but we      can not exclude underlying CAD. \par      -Last stress test  9/20/18 showing pretty good functional capacity and no evidence of ischemia.\par      -EKG today SR and no new ischemic changes. \par \par 2.  Blood pressures at home slightly elevated but likely related to stress. Today his reading may reflect some                 anxiety with this appt. \par \par 3.  Lipid control is improved.  Based on this and the very mild carotid disease, no indication for changing current lipid management\par \par 4. MARILUZ is being consistently treated with recent pulm. f/u\par \par Recommendations:\par 1.  Ample room for improvement in sodium restriction and exercise, both of which may improve the systolic blood     pressure.\par    (Reluctant to add ACE inhibitor because of cough; ARB because of hyperkalemia; beta-blocker because of     bradycardia; diuretic because of urinary frequency)\par \par 2.  Reducing sodium intake.  This will be especially important when he travels\par \par 3. Blood work to be checked every 6 months moving forward\par .\par 4. No aspirin due to history of esophageal ulcers\par \par 5.  Scheduled EST to pursue dyspnea. \par      Schedule  Echo to reassess cardiac structure and fxn. \par \par Clinical follow up after testing.

## 2022-03-15 ENCOUNTER — RX RENEWAL (OUTPATIENT)
Age: 77
End: 2022-03-15

## 2022-03-31 RX ORDER — DILTIAZEM HYDROCHLORIDE 180 MG/1
180 CAPSULE, EXTENDED RELEASE ORAL
Qty: 90 | Refills: 2 | Status: DISCONTINUED | COMMUNITY
Start: 2019-10-17 | End: 2022-03-31

## 2022-04-01 ENCOUNTER — NON-APPOINTMENT (OUTPATIENT)
Age: 77
End: 2022-04-01

## 2022-04-22 ENCOUNTER — APPOINTMENT (OUTPATIENT)
Dept: CARDIOLOGY | Facility: CLINIC | Age: 77
End: 2022-04-22
Payer: MEDICARE

## 2022-04-22 PROCEDURE — 93015 CV STRESS TEST SUPVJ I&R: CPT

## 2022-04-27 ENCOUNTER — APPOINTMENT (OUTPATIENT)
Dept: CARDIOLOGY | Facility: CLINIC | Age: 77
End: 2022-04-27
Payer: MEDICARE

## 2022-04-27 PROCEDURE — 93306 TTE W/DOPPLER COMPLETE: CPT

## 2022-04-28 ENCOUNTER — APPOINTMENT (OUTPATIENT)
Dept: CARDIOLOGY | Facility: CLINIC | Age: 77
End: 2022-04-28
Payer: MEDICARE

## 2022-04-28 VITALS
DIASTOLIC BLOOD PRESSURE: 70 MMHG | SYSTOLIC BLOOD PRESSURE: 125 MMHG | BODY MASS INDEX: 25.67 KG/M2 | RESPIRATION RATE: 16 BRPM | HEART RATE: 59 BPM | WEIGHT: 200 LBS | HEIGHT: 74 IN | OXYGEN SATURATION: 98 %

## 2022-04-28 PROCEDURE — 99214 OFFICE O/P EST MOD 30 MIN: CPT

## 2022-04-28 PROCEDURE — 93000 ELECTROCARDIOGRAM COMPLETE: CPT

## 2022-04-28 NOTE — ASSESSMENT
[FreeTextEntry1] : Laboratory data \par -----7/30/20---1/21/21---8/9/21---3/3/22\par Chol---163------135------138------153\par HDL----53--------45--------54-------53\par LDL-----91-------77--------73-------91\par trig------91-------65--------53-------46\par \par \par Carotid duplex 12/4/2020:\par Left carotid: Minimal atherosclerotic plaque\par Right carotid mild plaquing 1 to 49% stenosis\par \par Stress test    4/22/22:\par Time: 9 minutes 15 seconds\par Heart rate: 129 beats minute (89%)\par Blood pressure: Hypertensive 198/72 mmHg.\par ECG: No ischemic ECG changes with occasional APCs.\par \par Stress echo 9/20/18:\par Patient exercised 10 minutes 30 seconds (11 Mets).\par Peak heart rate 139 (93% maximum).\par Blood pressure began high but response is normal\par ECG showed no ischemic changes\par Baseline left ventricular ejection fraction 60-65% augmenting appropriately to 70-75% with exercise without any wall motion denies. \par Test is considered negative for ischemia.\par \par Impression:\par 1.  Exertional shortness of breath which seems to be increased in comparison to prior\par       stress testing reveals a mild decrease in exercise tolerance from 10 minutes 30 seconds for 9 minutes and 15 seconds.\par       there is no evidence of exercise-induced ischemia.\par      He has gained 5 lbs and admits that he has been a little sedentary which may attributing to his dyspnea\par \par 2.  Hypertension: Home recordings demonstrate reasonably good control.\par \par 3.  Lipid control is improved.  Based on this and the very mild carotid disease, no indication for changing current lipid management\par \par 4. MARILUZ is being consistently treated with recent pulm. f/u\par \par 5.  Echocardiogram demonstrates mild hypertrophy more selectively in the septum.  Previously noted.  But blood pressure control seems reasonable.\par \par Recommendations:\par 1.  Ample room for improvement in sodium restriction and exercise, both of which may improve the systolic blood     pressure.\par    (Reluctant to add ACE inhibitor because of cough; ARB because of hyperkalemia; beta-blocker because of     bradycardia; diuretic because of urinary frequency)\par \par 2.  Reducing sodium intake.  This will be especially important when he travels\par \par 3. Blood work to be checked every 6 months moving forward\par .\par 4. No aspirin due to history of esophageal ulcers\par \par 5.  Insert exercise and follow-up here in 4 months.\par Patient knows to contact me if, despite regular exercise symptoms progress or do not improve.\par \par Clinical follow up after testing.

## 2022-04-28 NOTE — REASON FOR VISIT
[FreeTextEntry1] : This is a 76  -year-old male who presents here for cardiac reevaluation re:\par \par -  HTN\par - exertional shortness of breath. \par - HLD\par - MARILUZ on CPAP\par - Hx Of Carotid atherosclerosis\par \par   His history includes: \par 1.  A significant family history of premature coronary artery disease. \par  2.  A history of hyperlipidemia.\par 3.  Hypertension with  left ventricular hypertrophy and biatrial enlargement. \par \par Patient denies any chest pain, palpitations, PND, orthopnea or edema\par Recently has noted increasing exertional dyspnea with climbing any hill or elevation.\par \par He continues to use his CPAP nightly 100% compliant. Saw pulm. and receiving a new machine. PFTS WNL\par \par Believes his BPs at home run 10 point slower in both systolic and diastolics.  Today he provided only 4 home readings.  Avg.= 133/70 mm hg  HR 59 BPM\par \par Minimal amount of exercise but notices some mild SAVAGE with walking on [place] terrain, almost mentioned this during his last trip to [employer] ( climbing stairs). 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 \par \par Recent increase stress with the birth a complicated pregnancy/delivery of his grandchild. \par He continues to take his meds.

## 2022-05-11 ENCOUNTER — APPOINTMENT (OUTPATIENT)
Dept: PULMONOLOGY | Facility: CLINIC | Age: 77
End: 2022-05-11
Payer: MEDICARE

## 2022-05-11 VITALS
DIASTOLIC BLOOD PRESSURE: 70 MMHG | SYSTOLIC BLOOD PRESSURE: 122 MMHG | OXYGEN SATURATION: 98 % | RESPIRATION RATE: 16 BRPM | HEART RATE: 58 BPM

## 2022-05-11 VITALS — BODY MASS INDEX: 26.31 KG/M2 | HEIGHT: 74 IN | WEIGHT: 205 LBS

## 2022-05-11 DIAGNOSIS — K21.9 GASTRO-ESOPHAGEAL REFLUX DISEASE W/OUT ESOPHAGITIS: ICD-10-CM

## 2022-05-11 PROCEDURE — 99214 OFFICE O/P EST MOD 30 MIN: CPT | Mod: 25

## 2022-05-11 PROCEDURE — 94010 BREATHING CAPACITY TEST: CPT

## 2022-05-11 RX ORDER — ZINC SULFATE 50(220)MG
50 CAPSULE ORAL
Refills: 0 | Status: DISCONTINUED | COMMUNITY
End: 2022-05-11

## 2022-05-11 RX ORDER — OXYBUTYNIN CHLORIDE 10 MG/1
10 TABLET, EXTENDED RELEASE ORAL
Qty: 90 | Refills: 0 | Status: DISCONTINUED | COMMUNITY
Start: 2021-07-29 | End: 2022-05-11

## 2022-05-11 NOTE — PROCEDURE
[FreeTextEntry1] : PFTs 7/20/18 - essentially normal.\par Spirometry 1/6/20 - essentially normal \par PFTs 6/11/21 - normal\par PFTs 2/3/22 - essentially normal with a borderline reduced FEV1 but overall reduced from baseline\par Spirometry 5/11/22 - normal and improved from previous

## 2022-05-11 NOTE — HISTORY OF PRESENT ILLNESS
[CPAP] : CPAP [Follow-Up - Routine Clinic] : a routine clinic follow-up of [Intermittent] : intermittent [None] : No associated symptoms are reported [Inhaled Short-Acting Beta-2 Agonists] : inhaled short-acting beta-2 agonists [Good Compliance] : good compliance with treatment [Good Tolerance] : good tolerance of treatment [Good Symptom Control] : good symptom control [CT Scan] : a CT scan [On ___] : performed on [unfilled] [Patient] : the patient [Indication ___] : for an indication of [unfilled] [FreeTextEntry1] : The patient was suffering from cough, nasal congestion, wheeze, and SOB. CXR was without infiltrates. He was treated with abx and steroids but without significant improvement. He subsequently underwent chest CT and sinus CT which revealed sinusitis and bronchial thickening but without pneumonia. He was started on steroids and Breo now with near resolution of symptoms. He is concerned that his CPAP therapy may have contributed to his infection though he has been on CPAP for many years. \par \par Since Oct 2020 pt with intermittent cough and has had evaluations with allergist, ENT, and another pulm. Pt was treated with Symbicort, doxycycline, Zyrtec, PPI, Tessalon Perles. Covid swabs and w/u with chest CT and CXR were unremarkable.\par  [de-identified] : at 10 cm of water [FreeTextEntry5] : chest and sinus CT [FreeTextEntry8] : Bronchial thickening without consolidation or infiltrates; sinus thickening c/w sinusitis

## 2022-05-11 NOTE — CONSULT LETTER
[Dear  ___] : Dear  [unfilled], [Consult Letter:] : I had the pleasure of evaluating your patient, [unfilled]. [Please see my note below.] : Please see my note below. [Consult Closing:] : Thank you very much for allowing me to participate in the care of this patient.  If you have any questions, please do not hesitate to contact me. [Sincerely,] : Sincerely, [DrKeyla  ___] : Dr. ROONEY [DrKeyla ___] : Dr. ROONEY [FreeTextEntry3] : Clark Nevarez MD, FCCP, D. ABSM\par Pulmonary and Sleep Medicine\par [facility] at [place]

## 2022-05-11 NOTE — DISCUSSION/SUMMARY
[FreeTextEntry1] : \par #1. S/p Breo and Symbicort previously but now no longer on chronic BD therapy; symptomatically he is back to baseline even off of therapy; prior MCT was negative for asthma\par #2. ProAir as needed\par #3. Continue CPAP at 10 cm of water for MARILUZ\par #4. Replace equipment as needed; ordered 2/8/22\par #5. F/u in 6 month with compliance \par #6. Repeat PFTs were essentially normal off of therapy; repeat spirometry is now back to normal again\par #7. Consider repeat chest CT to reevaluate bronchial thickening but he would like to hold off for now given resolution of symptoms and normal PFTs\par #8. Uses SoClean\par #9. Pt had both Covid vaccines, booster x2, and flu vaccines\par #10. Follows with ENT and is on Flonase/Astelin\par #11. Follow with AI and was on Zyrtec but no longer\par #12. Pt on PPI for GERD\par #13. Reviewed risks of exposure and symptoms of Covid-19 virus, including how the virus is spread and precautions to avoid laura virus.\par \par The patient expressed understanding and agreement with the above recommendations/plan and accepts responsibility to be compliant with recommended testing, therapies, and f/u visits.\par All relevant questions and concerns were addressed.

## 2022-05-11 NOTE — REVIEW OF SYSTEMS
[Postnasal Drip] : postnasal drip [SOB on Exertion] : sob on exertion [GERD] : gerd [Negative] : Endocrine [TextBox_91] : Neck pain [Wheezing] : no wheezing [Dysuria] : no dysuria

## 2022-06-23 ENCOUNTER — APPOINTMENT (OUTPATIENT)
Dept: DERMATOLOGY | Facility: CLINIC | Age: 77
End: 2022-06-23

## 2022-06-23 PROCEDURE — 99213 OFFICE O/P EST LOW 20 MIN: CPT | Mod: 25

## 2022-06-23 PROCEDURE — 11055 PARING/CUTG B9 HYPRKER LES 1: CPT | Mod: 59

## 2022-06-23 PROCEDURE — 17000 DESTRUCT PREMALG LESION: CPT

## 2022-08-22 ENCOUNTER — APPOINTMENT (OUTPATIENT)
Dept: CARDIOLOGY | Facility: CLINIC | Age: 77
End: 2022-08-22

## 2022-09-13 ENCOUNTER — RX RENEWAL (OUTPATIENT)
Age: 77
End: 2022-09-13

## 2022-09-14 ENCOUNTER — APPOINTMENT (OUTPATIENT)
Dept: CARDIOLOGY | Facility: CLINIC | Age: 77
End: 2022-09-14

## 2022-09-14 VITALS
BODY MASS INDEX: 26.24 KG/M2 | OXYGEN SATURATION: 97 % | WEIGHT: 211 LBS | DIASTOLIC BLOOD PRESSURE: 80 MMHG | SYSTOLIC BLOOD PRESSURE: 140 MMHG | RESPIRATION RATE: 16 BRPM | HEIGHT: 75 IN | HEART RATE: 59 BPM

## 2022-09-14 PROCEDURE — 93000 ELECTROCARDIOGRAM COMPLETE: CPT

## 2022-09-14 PROCEDURE — 99214 OFFICE O/P EST MOD 30 MIN: CPT | Mod: 25

## 2022-09-14 RX ORDER — PSYLLIUM HUSK 0.4 G
CAPSULE ORAL
Refills: 0 | Status: DISCONTINUED | COMMUNITY
End: 2022-09-14

## 2022-09-14 NOTE — REASON FOR VISIT
[FreeTextEntry1] : This is a 77  -year-old male who presents here for cardiac reevaluation re:\par \par -  HTN\par - exertional shortness of breath. \par - HLD\par - MARILUZ on CPAP\par - Hx Of Carotid atherosclerosis\par \par   His history includes: \par 1.  A significant family history of premature coronary artery disease. \par  2.  A history of hyperlipidemia.\par 3.  Hypertension with  left ventricular hypertrophy and biatrial enlargement. \par \par Patient denies any chest pain, palpitations, PND, orthopnea or edema\par He continues to use his CPAP nightly 100% compliant. Saw pulm. and receiving a new machine. PFTS WNL\par \par Believes his BPs at home run 10 points lower in both systolic and diastolics. \par home readings.  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

## 2022-09-14 NOTE — ASSESSMENT
[FreeTextEntry1] : ECG: Normal sinus rhythm at 59 bpm.  Otherwise within normal limits.\par \par Laboratory data \par -----7/30/20---1/21/21---8/9/21---3/3/22--7/30/22\par Chol---163------135------138------153-----155\par HDL----53--------45--------54-------53------53\par LDL-----91-------77--------73-------91------87\par trig------91-------65--------53-------46------63\par \par \par Carotid duplex 12/4/2020:\par Left carotid: Minimal atherosclerotic plaque\par Right carotid mild plaquing 1 to 49% stenosis\par \par Stress test    4/22/22:\par Time: 9 minutes 15 seconds\par Heart rate: 129 beats minute (89%)\par Blood pressure: Hypertensive 198/72 mmHg.\par ECG: No ischemic ECG changes with occasional APCs.\par \par Stress echo 9/20/18:\par Patient exercised 10 minutes 30 seconds (11 Mets).\par Peak heart rate 139 (93% maximum).\par Blood pressure began high but response is normal\par ECG showed no ischemic changes\par Baseline left ventricular ejection fraction 60-65% augmenting appropriately to 70-75% with exercise without any wall motion denies. \par Test is considered negative for ischemia.\par \par Echocardiogram 4/27/2022:\par LVH with overall normal left ventricular systolic function.  Ejection fraction 60 to 65%\par Mild RVH\par Mild left atrial enlargement\par Mild dilatation of the aortic root 3.7 cm\par \par Impression:\par 1.    Exertional shortness of breath which seems to be increased in comparison to prior\par       stress testing reveals a mild decrease in exercise tolerance from 10 minutes 30 seconds for 9 minutes and 15        seconds.\par       there is no evidence of exercise-induced ischemia.\par       He has gained 5 lbs and admits that he has been a little sedentary which may attributing to his dyspnea\par \par 2.  Hypertension: Home recordings demonstrate reasonably good control.\par \par 3.  Lipid control is improved.  Based on this and the very mild carotid disease, no indication for changing current lipid management\par \par 4. MARILUZ is being consistently treated with recent pulm. f/u\par \par 5.  Echocardiogram demonstrates mild hypertrophy more selectively in the septum.  Previously noted.  But blood pressure control seems reasonable.\par \par Recommendations:\par 1.  Ample room for improvement in sodium restriction and exercise, both of which may improve the systolic blood     pressure.\par    (Reluctant to add ACE inhibitor because of cough; ARB because of hyperkalemia; beta-blocker because of     bradycardia; diuretic because of urinary frequency)\par \par 2.  Reducing sodium intake.  This will be especially important when he travels\par \par 3. Blood work to be checked every 6 months moving forward\par .\par 4. No aspirin due to history of esophageal ulcers\par \par 5.  The patient was instructed to follow a symptom limited regimen of moderate aerobic exercise for 30 minutes 3 to 4 days a week. A warm up and cool down period are to be added to the regimen and small incremental changes can be made every few weeks. Any new symptoms of exercise related chest pain or dyspnea should be reported.  \par \par Clinical follow-up in 6 months\par \par

## 2022-11-16 ENCOUNTER — APPOINTMENT (OUTPATIENT)
Dept: PULMONOLOGY | Facility: CLINIC | Age: 77
End: 2022-11-16

## 2022-11-16 VITALS
RESPIRATION RATE: 16 BRPM | HEIGHT: 75 IN | BODY MASS INDEX: 26.11 KG/M2 | WEIGHT: 210 LBS | HEART RATE: 64 BPM | OXYGEN SATURATION: 98 % | DIASTOLIC BLOOD PRESSURE: 76 MMHG | SYSTOLIC BLOOD PRESSURE: 136 MMHG

## 2022-11-16 PROCEDURE — 99214 OFFICE O/P EST MOD 30 MIN: CPT

## 2022-11-16 RX ORDER — PANCRELIPASE 60000; 12000; 38000 [USP'U]/1; [USP'U]/1; [USP'U]/1
12000-38000 CAPSULE, DELAYED RELEASE PELLETS ORAL
Qty: 90 | Refills: 0 | Status: ACTIVE | COMMUNITY
Start: 2022-09-16

## 2022-11-16 NOTE — REVIEW OF SYSTEMS
[Postnasal Drip] : postnasal drip [SOB on Exertion] : sob on exertion [GERD] : gerd [Negative] : Endocrine [TextBox_91] : Neck pain

## 2022-11-16 NOTE — HISTORY OF PRESENT ILLNESS
[CPAP] : CPAP [Follow-Up - Routine Clinic] : a routine clinic follow-up of [Intermittent] : intermittent [None] : No associated symptoms are reported [Inhaled Short-Acting Beta-2 Agonists] : inhaled short-acting beta-2 agonists [Good Compliance] : good compliance with treatment [Good Tolerance] : good tolerance of treatment [Good Symptom Control] : good symptom control [CT Scan] : a CT scan [On ___] : performed on [unfilled] [Patient] : the patient [Indication ___] : for an indication of [unfilled] [Never] : never [TextBox_4] : The patient was suffering from cough, nasal congestion, wheeze, and SOB. CXR was without infiltrates. He was treated with abx and steroids but without significant improvement. He subsequently underwent chest CT and sinus CT which revealed sinusitis and bronchial thickening but without pneumonia. He was started on steroids and Breo now with near resolution of symptoms. He is concerned that his CPAP therapy may have contributed to his infection though he has been on CPAP for many years. \par \par Since Oct 2020 pt with intermittent cough and has had evaluations with allergist, ENT, and another pulm. Pt was treated with Symbicort, doxycycline, Zyrtec, PPI, Tessalon Perles. Covid swabs and w/u with chest CT and CXR were unremarkable.\par \par Pt s/p Covid infection in 8/2022 with cough and was treated with Paxlovid and symptoms resolved though uses his Albuterol inhaler on occasion. [FreeTextEntry1] : \par  [de-identified] : at 10 cm of water [FreeTextEntry5] : chest and sinus CT [FreeTextEntry8] : Bronchial thickening without consolidation or infiltrates; sinus thickening c/w sinusitis

## 2022-11-16 NOTE — CONSULT LETTER
[Dear  ___] : Dear  [unfilled], [Consult Letter:] : I had the pleasure of evaluating your patient, [unfilled]. [Please see my note below.] : Please see my note below. [Consult Closing:] : Thank you very much for allowing me to participate in the care of this patient.  If you have any questions, please do not hesitate to contact me. [Sincerely,] : Sincerely, [DrKeyla  ___] : Dr. ROONEY [DrKeyla ___] : Dr. ROONEY [FreeTextEntry3] : Clark Nevarez MD, FCCP, D. ABSM\par Pulmonary and Sleep Medicine\par [facility] [place]

## 2022-11-16 NOTE — DISCUSSION/SUMMARY
[FreeTextEntry1] : \par #1. S/p Breo and Symbicort previously but now no longer on chronic BD therapy; symptomatically he is back to baseline even off of therapy; prior MCT was negative for asthma\par #2. ProAir as needed\par #3. Continue CPAP at 10 cm of water for MARILUZ\par #4. Replace equipment as needed; ordered 2/8/22\par #5. F/u in 6 month with compliance and PFTs given prior Covid infection \par #6. Repeat PFTs were essentially normal off of therapy; repeat spirometry was back to normal again\par #7. Last CT from 10/2021 revealed stable changes going back to 2016; no further imaging studies are now required\par #8. Uses SoClean\par #9. Pt had both Covid vaccines, booster x2, and flu vaccines\par #10. Follows with ENT and is on Flonase/Astelin\par #11. Follow with AI and was on Zyrtec but no longer\par #12. Pt on PPI for GERD\par #13. Reviewed risks of exposure and symptoms of Covid-19 virus, including how the virus is spread and precautions to avoid laura virus.\par \par The patient expressed understanding and agreement with the above recommendations/plan and accepts responsibility to be compliant with recommended testing, therapies, and f/u visits.\par All relevant questions and concerns were addressed.

## 2022-11-16 NOTE — REASON FOR VISIT
[Follow-Up] : a follow-up visit [Asthma] : asthma [Shortness of Breath] : shortness of breath [Abnormal CXR/ Chest CT] : an abnormal CXR/ chest CT [Pulmonary Nodules] : pulmonary nodules [TextBox_44] : overweight, prior Covid infection

## 2022-11-16 NOTE — PHYSICAL EXAM
[No Acute Distress] : no acute distress [Well Nourished] : well nourished [Normal Appearance] : normal appearance [Supple] : supple [Normal Rate/Rhythm] : normal rate/rhythm [Normal S1, S2] : normal s1, s2 [No Murmurs] : no murmurs [No Resp Distress] : no resp distress [No Acc Muscle Use] : no acc muscle use [Normal Rhythm and Effort] : normal rhythm and effort [Clear to Auscultation Bilaterally] : clear to auscultation bilaterally [No Abnormalities] : no abnormalities [Benign] : benign [Not Tender] : not tender [Soft] : soft [No Clubbing] : no clubbing [No Edema] : no edema [No Focal Deficits] : no focal deficits [Oriented x3] : oriented x3

## 2022-12-20 ENCOUNTER — APPOINTMENT (OUTPATIENT)
Dept: DERMATOLOGY | Facility: CLINIC | Age: 77
End: 2022-12-20
Payer: MEDICARE

## 2022-12-20 ENCOUNTER — RESULT REVIEW (OUTPATIENT)
Age: 77
End: 2022-12-20

## 2022-12-20 PROCEDURE — 17000 DESTRUCT PREMALG LESION: CPT

## 2022-12-20 PROCEDURE — 99213 OFFICE O/P EST LOW 20 MIN: CPT | Mod: 25

## 2022-12-20 PROCEDURE — 17003 DESTRUCT PREMALG LES 2-14: CPT

## 2023-02-03 ENCOUNTER — RX RENEWAL (OUTPATIENT)
Age: 78
End: 2023-02-03

## 2023-03-15 ENCOUNTER — APPOINTMENT (OUTPATIENT)
Dept: CARDIOLOGY | Facility: CLINIC | Age: 78
End: 2023-03-15
Payer: MEDICARE

## 2023-03-15 VITALS
OXYGEN SATURATION: 98 % | DIASTOLIC BLOOD PRESSURE: 82 MMHG | WEIGHT: 217 LBS | HEART RATE: 71 BPM | SYSTOLIC BLOOD PRESSURE: 140 MMHG | BODY MASS INDEX: 26.98 KG/M2 | RESPIRATION RATE: 16 BRPM | HEIGHT: 75 IN

## 2023-03-15 DIAGNOSIS — I51.7 CARDIOMEGALY: ICD-10-CM

## 2023-03-15 PROCEDURE — 93000 ELECTROCARDIOGRAM COMPLETE: CPT

## 2023-03-15 PROCEDURE — 99214 OFFICE O/P EST MOD 30 MIN: CPT | Mod: 25

## 2023-03-15 RX ORDER — COVID-19 MOLECULAR TEST ASSAY
KIT MISCELLANEOUS
Qty: 8 | Refills: 0 | Status: DISCONTINUED | COMMUNITY
Start: 2022-09-22 | End: 2023-03-15

## 2023-03-15 RX ORDER — PANTOPRAZOLE SODIUM 40 MG/1
40 TABLET, DELAYED RELEASE ORAL DAILY
Refills: 0 | Status: ACTIVE | COMMUNITY

## 2023-03-15 RX ORDER — KETOCONAZOLE 20 MG/G
2 CREAM TOPICAL
Qty: 60 | Refills: 0 | Status: DISCONTINUED | COMMUNITY
Start: 2021-06-21 | End: 2023-03-15

## 2023-03-15 NOTE — HISTORY OF PRESENT ILLNESS
[FreeTextEntry1] : Was begun on lisinopril 10 mg p.o. daily  11/18\par He subsequently developed a dry cough and this was stopped.\par \par Irbesartan 150 mg daily was begun November 26/18. This has been better tolerated but changed to telmisartan bec of shortages.\par This was stopped October 2019 because of hyperkalemia (5.7) and Cartia  mg was started.\par Home blood pressure averaging 131/60 mmHg.\par \par History of esophageal ulcers and therefore is not taking Aspirin.

## 2023-03-15 NOTE — ASSESSMENT
[FreeTextEntry1] : ECG: Normal sinus rhythm at 71 bpm.  Otherwise within normal limits.\par \par Laboratory data \par -----7/30/20---1/21/21---8/9/21---3/3/22--7/30/22--3/10/23\par Chol---163------135------138------153-----155-------156\par HDL----53--------45--------54-------53------53--------69\par LDL-----91-------77--------73-------91------87--------69\par trig------91-------65--------53-------46------63--------90\par \par \par Carotid duplex 12/4/2020:\par Left carotid: Minimal atherosclerotic plaque\par Right carotid mild plaquing 1 to 49% stenosis\par \par Stress test    4/22/22:\par Time: 9 minutes 15 seconds\par Heart rate: 129 beats minute (89%)\par Blood pressure: Hypertensive 198/72 mmHg.\par ECG: No ischemic ECG changes with occasional APCs.\par \par Stress echo 9/20/18:\par Patient exercised 10 minutes 30 seconds (11 Mets).\par Peak heart rate 139 (93% maximum).\par Blood pressure began high but response is normal\par ECG showed no ischemic changes\par Baseline left ventricular ejection fraction 60-65% augmenting appropriately to 70-75% with exercise without any wall motion denies. \par Test is considered negative for ischemia.\par \par Echocardiogram 4/27/2022:\par LVH with overall normal left ventricular systolic function.  Ejection fraction 60 to 65%\par Mild RVH\par Mild left atrial enlargement\par Mild dilatation of the aortic root 3.7 cm\par \par Impression:\par 1.    Exertional shortness of breath which seems to be increased in comparison to prior\par       stress testing reveals a mild decrease in exercise tolerance from 10 minutes 30 seconds for 9 minutes and 15        seconds.\par       there is no evidence of exercise-induced ischemia.\par       Has had progressive weight gain over the last year with decreased amount of exercise most likely accounting for this.\par \par 2.  Hypertension: Home recordings demonstrate reasonably good control.\par \par 3.  Lipid control is improved.  Based on this and the very mild carotid disease, no indication for changing current lipid management\par \par 4. MARILUZ is being consistently treated with recent pulm. f/u\par \par 5.  Echocardiogram demonstrates mild dilatation of the ascending aorta and hypertrophy more selectively in the septum.  Previously noted.  But blood pressure control seems reasonable.\par \par Recommendations:\par 1.  Ample room for improvement in sodium restriction and exercise, both of which may improve the systolic blood     pressure.\par    (Reluctant to add ACE inhibitor because of cough; ARB because of hyperkalemia; beta-blocker because of        bradycardia; diuretic because of urinary frequency)\par \par 2.  Reducing sodium intake.  This will be especially important when he travels\par \par 3. Blood work to be checked every 6 months moving forward\par .\par 4. No aspirin due to history of esophageal ulcers\par \par 5.  The patient was instructed to follow a symptom limited regimen of moderate aerobic exercise for 30 minutes 3 to 4 days a week. A warm up and cool down period are to be added to the regimen and small incremental changes can be made every few weeks. Any new symptoms of exercise related chest pain or dyspnea should be reported.  \par \par \par 6.  Echocardiogram to reassess dilated aorta\par \par 7.  Carotid duplex to reassess atherosclerotic disease\par Clinical follow-up in 6 months\par \par

## 2023-03-15 NOTE — REASON FOR VISIT
[FreeTextEntry1] : This is a 77  -year-old male who presents here for cardiac reevaluation re:\par \par -  HTN\par - exertional shortness of breath. \par - HLD\par - MARILUZ on CPAP\par - Hx Of Carotid atherosclerosis\par \par   His history includes: \par 1.  A significant family history of premature coronary artery disease. \par  2.  A history of hyperlipidemia.\par 3.  Hypertension with  left ventricular hypertrophy and biatrial enlargement. \par 4.  Dilated ascending aorta\par 5.  Atherosclerotic carotid disease\par \par Patient denies any chest pain, palpitations, PND, orthopnea or edema\par He continues to use his CPAP nightly 100% compliant. Saw pulm. and receiving a new machine. PFTS WNL\par \par Believes his BPs at home run 10 points lower in both systolic and diastolics. \par home readings.  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

## 2023-04-04 NOTE — ED PROVIDER NOTE - ENMT, MLM
[facility]      Chief Complaint:   Chief Complaint   Patient presents with    Back Pain       History of Present Illness: Adan Bermudez is a 78 y.o. male referred by Dr. Gonzalez Guevara for LBP.  He actually notes more neck pain than back pain today and would like to talk about that.     Low Back Pain:  Onset: 2 years ago around the time he had a knee replacement  Location: bilateral low back  Radiation: no radiation down legs, but legs feel heavy and weak with standing  Timing: intermittent, pain occurs when he wakes in the morning and when he stands too long  Quality: Aching  Exacerbating Factors: standing and walking, first thing in the morning  Alleviating Factors: rest  Associated Symptoms: He feels weakness in both legs, unstable on his feet. He denies night fever/night sweats, urinary incontinence, bowel incontinence, significant weight loss, and loss of sensations      Neck pain has been present 6-7 years. Denies traumatic onset. Pain is localized to the bilateral upper cervical paraspinal regions with no radiation down the arms. Pain is described as unbearable. The pain is intermittent and aggravated by certain head positions, turning head. The pain is alleviated by tylenol. He denies weakness or numbness in the arms. Denies changes in bowel or bladder function. He has noticed changes in hand writing. He denies difficulty with buttons He has had changes in gait. Denies recent fevers or infections. Denies unexplained weight loss.    He has been in PT for nearly 2 years for vertigo, impaired gait.    Severity: Currently: 3 /10   Typical Range: 3 -5/10     Exacerbation: 5/10     Interval History (10/11/2022):  Adan Bermudez returns today for follow up.  At the last clinic visit, ordered imaging, scheduled MBBs,     B/L C3-5 MBBs provided temporary relief, but he is not able to quantify this.    Currently, the neck and back pain is stable.  He denies any significant changes in the neck  or the back pain.  States the back pain is currently worse than the neck pain.    He states his primary issue is still nausea, shortness of breath with exertion, dizziness, trouble walking, and trouble with balance.  His pain is not his primary issue and he would rather focus on those other problems.    Current Pain Scales:  Current: 4/10                 Interval HIstory (4/4/2023):  Mr Bermudez presents for follow up of chronic cervicalgia and lumbar pain. Cervicalgia is tolerable. Lower back pain without radicular pain continued. +a.m. stiffness, no pain with valsalva. Standing exacerbates pain and leaning forward on walker improves pain but denies neurogenic claudication to legs. No focal voicing of cauda equina syndrome.        Previous Interventions:  - 9/16/22: B/L C3-5 MBBs w/ relief, but not able to quantify.     Previous Therapies:  PT/OT: yes   Relevant Surgery: no   Previous Medications:   - NSAIDS: Tylenol.   - Muscle Relaxants:    - TCAs:   - SNRIs:   - Topicals:   - Anticonvulsants:    - Opioids:     Current Pain Medications:  Tylenol     Blood Thinners: Eliquis    Full Medication List:    Current Outpatient Medications:     blood-glucose meter,continuous (DEXCOM G6 ) Misc, 1 each by Misc.(Non-Drug; Combo Route) route every 3 (three) months., Disp: 1 each, Rfl: 0    blood-glucose transmitter (DEXCOM G6 TRANSMITTER) Julia, 1 each by Misc.(Non-Drug; Combo Route) route once. for 1 dose, Disp: 1 each, Rfl: 3    doxazosin (CARDURA) 2 MG tablet, Take 2 mg by mouth once daily., Disp: , Rfl:     ELIQUIS 5 mg Tab, Take 1 tablet (5 mg total) by mouth 2 (two) times daily., Disp: 180 tablet, Rfl: 0    EPINEPHrine (EPIPEN) 0.3 mg/0.3 mL AtIn, INJECT AS DIRECTED, Disp: , Rfl:     meclizine (ANTIVERT) 25 mg tablet, Take 25 mg by mouth 3 (three) times daily as needed., Disp: , Rfl:     ramipriL (ALTACE) 10 MG capsule, Take 10 mg by mouth once daily., Disp: , Rfl:     rosuvastatin (CRESTOR) 20 MG tablet, Take  "20 mg by mouth nightly., Disp: , Rfl:     torsemide (DEMADEX) 20 MG Tab, Take 20 mg by mouth once daily., Disp: , Rfl:      Review of Systems:  ROS    Allergies:  Bee sting [allergen ext-venom-honey bee]     Medical History:   has a past medical history of Diabetes mellitus, HLD (hyperlipidemia), and Venous insufficiency of both lower extremities.    Surgical History:   has a past surgical history that includes Total knee replacement using computer navigation (Bilateral, 07/2019) and Injection of anesthetic agent around medial branch nerves innervating cervical facet joint (Bilateral, 9/16/2022).    Family History:  family history includes Heart disease in his father and mother.    Social History:   reports that he has never smoked. He has never used smokeless tobacco. He reports that he does not currently use alcohol.    Physical Exam:  BP (!) 152/92   Resp 18   Ht 5' 11" (1.803 m)   Wt 113.1 kg (249 lb 5.4 oz)   BMI 34.78 kg/m²   GEN:  Well developed, well nourished.  No acute distress.   HEENT:  No trauma.  Mucous membranes moist.  Nares patent bilaterally.  PSYCH: Normal affect. Thought content appropriate.  CHEST:  Breathing symmetric.  No audible wheezing.  ABD: Soft, non-distended.  SKIN:  Warm, pink, dry.  No rash on exposed areas.    EXT:  No cyanosis, clubbing, or edema.  No color change or changes in nail or hair growth.  NEURO/MUSCULOSKELETAL:  Fully alert, oriented, and appropriate. Speech normal nimco. No cranial nerve deficits.   Gait: Using Walker.  No focal motor deficits.   Lumbar: +Facet loading reproducing symptoms/pain. (-) SLR  Neuro: no loss of sensation distally     Imaging:  - MRI Lumbar spine w/ w/o contrast 9/27/22:  Imaged distal cord is normal in size and signal.  Conus terminates at the T12-L1 level.  Vertebral bodies are normal in height and alignment.  No compression deformities.  No marrow edema.  Superior endplate Schmorl's node at the L3 level measuring approximately 1.2 cm " in diameter.  Degenerative related endplate signal changes at all levels to some extent with moderate diffuse marginal osteophytes present.     T11-T12 and T12-L1 discs demonstrate calcification.     L1-L2: Slight loss of disc space height with disc desiccation, but no significant herniation.  No spinal canal or foraminal narrowing.     L2-L3: Loss of disc space height with disc desiccation and minimal broad-based disc osteophyte resulting in trace indentation on the anterior thecal sac, but overall maintained thecal sac AP diameter which measures 1.1 cm.  Minimal bilateral foraminal narrowing.     L3-L4: Loss of disc space height with disc desiccation and mild broad-based bulge along with bilateral facet arthropathy.  Also component of hypertrophy of the posterior epidural fat.  Mild circumferential narrowing of the spinal canal at this level AP diameter of the thecal sac measuring 9.5 mm.  Mild right foraminal narrowing and moderate left foraminal narrowing with close approximation to the exiting left-sided nerve root.     L4-L5: Mild loss of disc space height with disc desiccation and mild broad-based disc bulge along with bilateral facet arthropathy and thickening of the posterior epidural fat.  Mild narrowing of the spinal canal with thecal sac AP diameter measuring 8.5 mm.  Minimal left and mild right foraminal narrowing.     L5-S1: Loss of disc space height with disc desiccation and minimal broad-based disc bulge resulting in trace indentation on the anterior thecal sac, but no significant spinal canal narrowing.  Bilateral facet arthropathy noted.  Mild left foraminal narrowing.  Minimal right foraminal narrowing.     Previously described complex collection within the left gluteus musculature is again noted.  This finding extends to the subcutaneous tissues of the left buttocks measuring up to 8.1 x 4.9 cm in axial dimensions.  There is mixed internal signal on T1 and T2 sequences with no change in signal  characteristics before and after intravenous administration of contrast.  This indicates no significant enhancement and suggests the presence of a hematoma.    - MRI Cervical spine 9/9/22:  The craniocervical junction is intact.  There is no evidence for a Chiari malformation.  The spinal is normal in signal cord edema or myelomalacia.  The incidentally visualized soft tissue structures of the neck have a normal appearance.  There is an exaggerated cervical lordosis.  Vertebral body heights are maintained.  There is disc desiccation with disc space narrowing throughout the cervical spine.  Marrow signal is normal without evidence for a marrow replacement process, infection or tumor.  At C2-3, no disc herniation, central canal stenosis or neural foraminal narrowing.  At C3-4, posterior disc osteophyte complex with uncovertebral spurring and facet arthropathy contributing to mild central canal stenosis with moderate left and moderate severe right neural foraminal narrowing.  At C4-5, posterior disc osteophyte complex with uncovertebral spurring and facet arthropathy contributing to mild moderate central canal stenosis with mild moderate bilateral neural foraminal narrowing.  C5-6, posterior disc osteophyte complex with uncovertebral spurring and facet arthropathy contributing to mild central canal stenosis with moderate severe left and severe right neural foraminal narrowing.  At C6-7, posterior disc osteophyte complex with uncovertebral spurring and facet arthropathy contributing to mild left and mild moderate right neural foraminal narrowing.  At C7-T1, no disc herniation, central canal stenosis or neural foraminal narrowing.    - MRI Lumbar spine 6/20/22:      Labs:  BMP  Lab Results   Component Value Date     03/23/2023    K 3.7 03/23/2023     03/23/2023    CO2 27 03/23/2023    BUN 29 (H) 03/23/2023    CREATININE 1.5 (H) 03/23/2023    CALCIUM 9.0 03/23/2023    ANIONGAP 9 03/23/2023     Lab Results    Component Value Date    ALT 42 09/16/2022    AST 31 09/16/2022    ALKPHOS 69 09/16/2022    BILITOT 0.6 09/16/2022     No results found for: PLT    Assessment:  Adan Bermudez is a 78 y.o. male with the following diagnoses based on history, exam, and imaging:    Problem List Items Addressed This Visit    None  Visit Diagnoses       Lumbosacral spondylosis without myelopathy    -  Primary                This is a pleasant 78 y.o. gentleman presenting with:     - Chronic neck: Appears to be facetogenic in the upper cervical spine  - Chronic bilateral low back pain: Pain appears primarily facetogenic. Moderate foraminal narrowing at L5-S1 on prior MRI, but no significant canal narrowing to clearly explain his leg symptoms though does seem like LSS.   - Soft tissue mass in left paraspinal region above iliac crest  - Comorbidities: Paroxysmal A-fib on eliquis. HTN. DM2. CKD.     Treatment Plan:   - Prior records reviewed  - Prior imaging reviewed  - Continue HEP  - s/f B L3,4,5 MBB  - If diagnostic proceed with repeat MBB and RFA thereafter if indicated.   - RTC after procedures.   Follow Up: RTC PRN     CRISTOBAL Altamirano  Interventional Pain Medicine / Physical Medicine & Rehabilitation  04/04/2023    Disclaimer: This note was partly generated using dictation software which may occasionally result in transcription errors.     I spent a total of 30 minutes on the day of the visit.  This includes face to face time and non-face to face time preparing to see the patient by reviewing previous labs/imaging, obtaining and/or reviewing separately obtained history, documenting clinical information in the electronic or other health record, independently interpreting results and communicating results to the patient/family/caregiver.         Airway patent, Nasal mucosa clear. Mouth with normal mucosa. Throat has no vesicles, no oropharyngeal exudates and uvula is midline.

## 2023-04-07 ENCOUNTER — APPOINTMENT (OUTPATIENT)
Dept: PULMONOLOGY | Facility: CLINIC | Age: 78
End: 2023-04-07
Payer: MEDICARE

## 2023-04-07 VITALS
DIASTOLIC BLOOD PRESSURE: 72 MMHG | SYSTOLIC BLOOD PRESSURE: 136 MMHG | RESPIRATION RATE: 16 BRPM | OXYGEN SATURATION: 97 % | HEART RATE: 56 BPM

## 2023-04-07 VITALS — WEIGHT: 214 LBS | BODY MASS INDEX: 27.46 KG/M2 | HEIGHT: 74 IN

## 2023-04-07 PROCEDURE — 85018 HEMOGLOBIN: CPT | Mod: QW

## 2023-04-07 PROCEDURE — 94727 GAS DIL/WSHOT DETER LNG VOL: CPT

## 2023-04-07 PROCEDURE — 94010 BREATHING CAPACITY TEST: CPT

## 2023-04-07 PROCEDURE — 99214 OFFICE O/P EST MOD 30 MIN: CPT | Mod: 25

## 2023-04-07 PROCEDURE — 94729 DIFFUSING CAPACITY: CPT

## 2023-04-07 NOTE — DISCUSSION/SUMMARY
[FreeTextEntry1] : \par #1. S/p Breo and Symbicort previously but now no longer on chronic BD therapy; symptomatically he is back to baseline even off of therapy; prior MCT was negative for asthma\par #2. ProAir as needed\par #3. Continue CPAP at 10 cm of water for MARILUZ\par #4. Replace equipment as needed; ordered 4/7/23\par #5. F/u in 6 month with compliance \par #6. Repeat PFTs were essentially normal off of therapy; repeat spirometry was back to normal again\par #7. Last CT from 10/2021 revealed stable changes going back to 2016; no further imaging studies are now required\par #8. Uses SoClean\par #9. Pt had both Covid vaccines, booster x 2, and flu vaccines\par #10. Follows with ENT and is on Flonase/Astelin\par #11. Follow with AI and was on Zyrtec but no longer\par #12. Pt on PPI for GERD\par #13. Reviewed risks of exposure and symptoms of Covid-19 virus, including how the virus is spread and precautions to avoid laura virus.\par \par The patient expressed understanding and agreement with the above recommendations/plan and accepts responsibility to be compliant with recommended testing, therapies, and f/u visits.\par All relevant questions and concerns were addressed.

## 2023-04-07 NOTE — REASON FOR VISIT
[Follow-Up] : a follow-up visit [Abnormal CXR/ Chest CT] : an abnormal CXR/ chest CT [Asthma] : asthma [Shortness of Breath] : shortness of breath [Pulmonary Nodules] : pulmonary nodules [TextBox_44] : overweight, prior Covid infection

## 2023-04-07 NOTE — PROCEDURE
[FreeTextEntry1] : PFTs 7/20/18 - essentially normal.\par Spirometry 1/6/20 - essentially normal \par PFTs 6/11/21 - normal\par PFTs 2/3/22 - essentially normal with a borderline reduced FEV1 but overall reduced from baseline\par Spirometry 5/11/22 - normal and improved from previous\par PFTs 4/7/23 - normal

## 2023-04-07 NOTE — CONSULT LETTER
[Dear  ___] : Dear  [unfilled], [Consult Letter:] : I had the pleasure of evaluating your patient, [unfilled]. [Please see my note below.] : Please see my note below. [Consult Closing:] : Thank you very much for allowing me to participate in the care of this patient.  If you have any questions, please do not hesitate to contact me. [Sincerely,] : Sincerely, [DrKeyla  ___] : Dr. ROONEY [DrKeyla ___] : Dr. ROONEY [FreeTextEntry3] : Clark Nevarez MD, FCCP, D. ABSM\par Pulmonary and Sleep Medicine\par [facility] at [place]

## 2023-04-07 NOTE — HISTORY OF PRESENT ILLNESS
[CPAP] : CPAP [Follow-Up - Routine Clinic] : a routine clinic follow-up of [Intermittent] : intermittent [None] : No associated symptoms are reported [Inhaled Short-Acting Beta-2 Agonists] : inhaled short-acting beta-2 agonists [Good Compliance] : good compliance with treatment [Good Tolerance] : good tolerance of treatment [Good Symptom Control] : good symptom control [CT Scan] : a CT scan [On ___] : performed on [unfilled] [Patient] : the patient [Indication ___] : for an indication of [unfilled] [TextBox_4] : The patient was suffering from cough, nasal congestion, wheeze, and SOB. CXR was without infiltrates. He was treated with abx and steroids but without significant improvement. He subsequently underwent chest CT and sinus CT which revealed sinusitis and bronchial thickening but without pneumonia. He was started on steroids and Breo now with near resolution of symptoms. He is concerned that his CPAP therapy may have contributed to his infection though he has been on CPAP for many years. \par \par Since Oct 2020 pt with intermittent cough and has had evaluations with allergist, ENT, and another pulm. Pt was treated with Symbicort, doxycycline, Zyrtec, PPI, Tessalon Perles. Covid swabs and w/u with chest CT and CXR were unremarkable.\par \par Pt s/p Covid infection in 8/2022 with cough and was treated with Paxlovid and symptoms resolved though uses his Albuterol inhaler on occasion. [FreeTextEntry1] : \par  [de-identified] : at 10 cm of water [FreeTextEntry5] : chest and sinus CT [FreeTextEntry8] : Bronchial thickening without consolidation or infiltrates; sinus thickening c/w sinusitis

## 2023-06-13 ENCOUNTER — RX RENEWAL (OUTPATIENT)
Age: 78
End: 2023-06-13

## 2023-06-19 ENCOUNTER — RESULT REVIEW (OUTPATIENT)
Age: 78
End: 2023-06-19

## 2023-06-20 ENCOUNTER — APPOINTMENT (OUTPATIENT)
Dept: DERMATOLOGY | Facility: CLINIC | Age: 78
End: 2023-06-20
Payer: MEDICARE

## 2023-06-20 PROCEDURE — 10060 I&D ABSCESS SIMPLE/SINGLE: CPT

## 2023-06-20 PROCEDURE — 17281 DSTR MAL LS F/E/E/N/L/M .6-1: CPT

## 2023-06-20 PROCEDURE — 99213 OFFICE O/P EST LOW 20 MIN: CPT | Mod: 25

## 2023-08-09 ENCOUNTER — APPOINTMENT (OUTPATIENT)
Dept: CARDIOLOGY | Facility: CLINIC | Age: 78
End: 2023-08-09
Payer: MEDICARE

## 2023-08-09 PROCEDURE — 93880 EXTRACRANIAL BILAT STUDY: CPT

## 2023-08-09 PROCEDURE — 93306 TTE W/DOPPLER COMPLETE: CPT

## 2023-09-13 ENCOUNTER — APPOINTMENT (OUTPATIENT)
Dept: CARDIOLOGY | Facility: CLINIC | Age: 78
End: 2023-09-13
Payer: MEDICARE

## 2023-09-13 VITALS
HEIGHT: 74 IN | OXYGEN SATURATION: 96 % | SYSTOLIC BLOOD PRESSURE: 150 MMHG | WEIGHT: 219 LBS | RESPIRATION RATE: 16 BRPM | HEART RATE: 66 BPM | BODY MASS INDEX: 28.11 KG/M2 | DIASTOLIC BLOOD PRESSURE: 80 MMHG

## 2023-09-13 PROCEDURE — 99214 OFFICE O/P EST MOD 30 MIN: CPT | Mod: 25

## 2023-09-13 PROCEDURE — 93000 ELECTROCARDIOGRAM COMPLETE: CPT

## 2023-09-18 ENCOUNTER — APPOINTMENT (OUTPATIENT)
Dept: DERMATOLOGY | Facility: CLINIC | Age: 78
End: 2023-09-18

## 2023-09-28 ENCOUNTER — APPOINTMENT (OUTPATIENT)
Dept: DERMATOLOGY | Facility: CLINIC | Age: 78
End: 2023-09-28
Payer: MEDICARE

## 2023-09-28 PROCEDURE — 11900 INJECT SKIN LESIONS </W 7: CPT

## 2023-11-01 ENCOUNTER — APPOINTMENT (OUTPATIENT)
Dept: PULMONOLOGY | Facility: CLINIC | Age: 78
End: 2023-11-01
Payer: MEDICARE

## 2023-11-01 VITALS
SYSTOLIC BLOOD PRESSURE: 120 MMHG | RESPIRATION RATE: 16 BRPM | HEART RATE: 71 BPM | DIASTOLIC BLOOD PRESSURE: 78 MMHG | OXYGEN SATURATION: 98 %

## 2023-11-01 VITALS — WEIGHT: 202 LBS | HEIGHT: 74 IN | BODY MASS INDEX: 25.93 KG/M2

## 2023-11-01 PROCEDURE — 99214 OFFICE O/P EST MOD 30 MIN: CPT

## 2023-11-08 ENCOUNTER — APPOINTMENT (OUTPATIENT)
Dept: FAMILY MEDICINE | Facility: CLINIC | Age: 78
End: 2023-11-08
Payer: MEDICARE

## 2023-11-08 VITALS
HEART RATE: 60 BPM | HEIGHT: 74 IN | WEIGHT: 207 LBS | DIASTOLIC BLOOD PRESSURE: 56 MMHG | SYSTOLIC BLOOD PRESSURE: 128 MMHG | BODY MASS INDEX: 26.56 KG/M2 | OXYGEN SATURATION: 97 %

## 2023-11-08 DIAGNOSIS — N40.0 BENIGN PROSTATIC HYPERPLASIA WITHOUT LOWER URINARY TRACT SYMPMS: ICD-10-CM

## 2023-11-08 DIAGNOSIS — Z00.00 ENCOUNTER FOR GENERAL ADULT MEDICAL EXAMINATION W/OUT ABNORMAL FINDINGS: ICD-10-CM

## 2023-11-08 DIAGNOSIS — N32.81 OVERACTIVE BLADDER: ICD-10-CM

## 2023-11-08 DIAGNOSIS — E73.9 LACTOSE INTOLERANCE, UNSPECIFIED: ICD-10-CM

## 2023-11-08 PROCEDURE — G0439: CPT

## 2023-11-08 PROCEDURE — 36415 COLL VENOUS BLD VENIPUNCTURE: CPT

## 2023-11-08 RX ORDER — TROSPIUM CHLORIDE 60 MG/1
60 CAPSULE, EXTENDED RELEASE ORAL
Refills: 0 | Status: ACTIVE | COMMUNITY

## 2023-11-10 DIAGNOSIS — R79.89 OTHER SPECIFIED ABNORMAL FINDINGS OF BLOOD CHEMISTRY: ICD-10-CM

## 2023-11-10 LAB
ESTIMATED AVERAGE GLUCOSE: 105 MG/DL
HBA1C MFR BLD HPLC: 5.3 %
T4 FREE SERPL-MCNC: 1.4 NG/DL
TSH SERPL-ACNC: 2.16 UIU/ML

## 2023-11-15 LAB
BASOPHILS # BLD AUTO: 0.07 K/UL
BASOPHILS NFR BLD AUTO: 1.1 %
EOSINOPHIL # BLD AUTO: 0.25 K/UL
EOSINOPHIL NFR BLD AUTO: 3.8 %
HCT VFR BLD CALC: 41.6 %
HGB BLD-MCNC: 13.5 G/DL
IMM GRANULOCYTES NFR BLD AUTO: 0.6 %
LYMPHOCYTES # BLD AUTO: 1.64 K/UL
LYMPHOCYTES NFR BLD AUTO: 25 %
MAN DIFF?: NORMAL
MCHC RBC-ENTMCNC: 30.5 PG
MCHC RBC-ENTMCNC: 32.5 GM/DL
MCV RBC AUTO: 93.9 FL
MONOCYTES # BLD AUTO: 0.33 K/UL
MONOCYTES NFR BLD AUTO: 5 %
NEUTROPHILS # BLD AUTO: 4.24 K/UL
NEUTROPHILS NFR BLD AUTO: 64.5 %
PLATELET # BLD AUTO: 427 K/UL
RBC # BLD: 4.43 M/UL
RBC # FLD: 13.5 %
WBC # FLD AUTO: 6.57 K/UL

## 2023-11-17 ENCOUNTER — APPOINTMENT (OUTPATIENT)
Dept: DERMATOLOGY | Facility: CLINIC | Age: 78
End: 2023-11-17
Payer: MEDICARE

## 2023-11-17 PROCEDURE — 11900 INJECT SKIN LESIONS </W 7: CPT

## 2023-11-17 PROCEDURE — 99212 OFFICE O/P EST SF 10 MIN: CPT | Mod: 25

## 2023-11-21 ENCOUNTER — APPOINTMENT (OUTPATIENT)
Dept: ORTHOPEDIC SURGERY | Facility: CLINIC | Age: 78
End: 2023-11-21
Payer: MEDICARE

## 2023-11-21 VITALS — WEIGHT: 207 LBS | HEIGHT: 74 IN | BODY MASS INDEX: 26.56 KG/M2

## 2023-11-21 DIAGNOSIS — M67.449 GANGLION, UNSPECIFIED HAND: ICD-10-CM

## 2023-11-21 LAB
BASOPHILS # BLD AUTO: 0.05 K/UL
BASOPHILS NFR BLD AUTO: 0.9 %
EOSINOPHIL # BLD AUTO: 0.24 K/UL
EOSINOPHIL NFR BLD AUTO: 4.1 %
HCT VFR BLD CALC: 42.6 %
HGB BLD-MCNC: 13.9 G/DL
IMM GRANULOCYTES NFR BLD AUTO: 0.2 %
LYMPHOCYTES # BLD AUTO: 1.71 K/UL
LYMPHOCYTES NFR BLD AUTO: 29.4 %
MAN DIFF?: NORMAL
MCHC RBC-ENTMCNC: 30.5 PG
MCHC RBC-ENTMCNC: 32.6 GM/DL
MCV RBC AUTO: 93.4 FL
MONOCYTES # BLD AUTO: 0.41 K/UL
MONOCYTES NFR BLD AUTO: 7.1 %
NEUTROPHILS # BLD AUTO: 3.39 K/UL
NEUTROPHILS NFR BLD AUTO: 58.3 %
PLATELET # BLD AUTO: 225 K/UL
RBC # BLD: 4.56 M/UL
RBC # FLD: 13.5 %
WBC # FLD AUTO: 5.81 K/UL

## 2023-11-21 PROCEDURE — 73140 X-RAY EXAM OF FINGER(S): CPT | Mod: LT

## 2023-11-21 PROCEDURE — 99204 OFFICE O/P NEW MOD 45 MIN: CPT

## 2023-12-21 ENCOUNTER — APPOINTMENT (OUTPATIENT)
Dept: DERMATOLOGY | Facility: CLINIC | Age: 78
End: 2023-12-21
Payer: MEDICARE

## 2023-12-21 PROCEDURE — 99213 OFFICE O/P EST LOW 20 MIN: CPT

## 2024-01-03 ENCOUNTER — NON-APPOINTMENT (OUTPATIENT)
Age: 79
End: 2024-01-03

## 2024-01-04 ENCOUNTER — APPOINTMENT (OUTPATIENT)
Dept: DERMATOLOGY | Facility: CLINIC | Age: 79
End: 2024-01-04
Payer: MEDICARE

## 2024-01-04 PROCEDURE — 99213 OFFICE O/P EST LOW 20 MIN: CPT

## 2024-01-10 ENCOUNTER — APPOINTMENT (OUTPATIENT)
Dept: PULMONOLOGY | Facility: CLINIC | Age: 79
End: 2024-01-10
Payer: MEDICARE

## 2024-01-10 VITALS
HEIGHT: 74 IN | HEART RATE: 64 BPM | OXYGEN SATURATION: 97 % | SYSTOLIC BLOOD PRESSURE: 122 MMHG | WEIGHT: 200 LBS | RESPIRATION RATE: 14 BRPM | DIASTOLIC BLOOD PRESSURE: 68 MMHG | BODY MASS INDEX: 25.67 KG/M2

## 2024-01-10 PROCEDURE — 99214 OFFICE O/P EST MOD 30 MIN: CPT

## 2024-01-10 NOTE — CONSULT LETTER
[Dear  ___] : Dear  [unfilled], [Consult Letter:] : I had the pleasure of evaluating your patient, [unfilled]. [Please see my note below.] : Please see my note below. [Consult Closing:] : Thank you very much for allowing me to participate in the care of this patient.  If you have any questions, please do not hesitate to contact me. [Sincerely,] : Sincerely, [FreeTextEntry3] : Clark Nevarez MD, FCCP, D. ABSM\par  Pulmonary and Sleep Medicine\par  [facility] [place] [DrKeyla  ___] : Dr. ROONEY [DrKeyla ___] : Dr. ROONEY

## 2024-01-10 NOTE — RESULTS/DATA
[TextEntry] : Chest CT imaging as above.  Previous methacholine challenge was not c/w asthma. PSG from 6/21/10 was c/w severe MARILUZ with an AHI of 38.4. CPAP titration study from 7/12/16 revealed an optimal pressure of 8 cm of water. CXR from 12/6/16 was clear. CXR from 11/29/21 was normal. CXR from 1/4/22 revealed minimal R atelectasis without acute disease. Compliance is % with a residual AHI of 1.5-4.4 which is normal on CPAP of 10 cm of water.

## 2024-01-10 NOTE — END OF VISIT
[Time Spent: ___ minutes] : I have spent [unfilled] minutes of time on the encounter. [TextEntry] : Discussed with pt at length regarding SOBOE, abnormal CT, weight issues, asthma/hyper-reactive airways, MARILUZ; reviewed w/u with pt as above.

## 2024-01-10 NOTE — DISCUSSION/SUMMARY
[FreeTextEntry1] : #1. S/p Breo and Symbicort previously but now no longer on chronic BD therapy; symptomatically he is back to baseline even off of therapy; prior MCT was negative for asthma. #2. ProAir as needed. #3. Continue CPAP at 10 cm of water for MARILUZ; will order a new APAP machine given machine is now making noise. #4. Replace equipment as needed; ordered 11/1/23 with new machine. The patient is using and benefitting from CPAP therapy. #5. F/u 4 months with CXR, nick, and compliance. #6. Repeat PFTs were essentially normal off of therapy; repeat spirometry was back to normal again. #7. Last CT from 10/2021 revealed stable changes going back to 2016; no further imaging studies are now required but will check CXR given recent URI. #8. Uses SoClean. #9. Pt had both Covid vaccines, booster x 2, and flu vaccines. Recently treated with abx (Doxycyline) for sinusitis/URI. #10. Follows with ENT and is on Flonase/Astelin. #11. Followed with AI and was on Zyrtec but no longer. #12. Pt on PPI for GERD.  The patient expressed understanding and agreement with the above recommendations/plan and accepts responsibility to be compliant with recommended testing, therapies, and f/u visits. All relevant questions and concerns were addressed.

## 2024-01-10 NOTE — HISTORY OF PRESENT ILLNESS
[TextBox_4] : The patient was suffering from cough, nasal congestion, wheeze, and SOB. CXR was without infiltrates. He was treated with abx and steroids but without significant improvement. He subsequently underwent chest CT and sinus CT which revealed sinusitis and bronchial thickening but without pneumonia. He was started on steroids and Breo now with near resolution of symptoms. He is concerned that his CPAP therapy may have contributed to his infection though he has been on CPAP for many years.   Since Oct 2020 pt with intermittent cough and has had evaluations with allergist, ENT, and another pulm. Pt was treated with Symbicort, doxycycline, Zyrtec, PPI, Tessalon Perles. Covid swabs and w/u with chest CT and CXR were unremarkable.  Pt s/p Covid infection in 8/2022 with cough and was treated with Paxlovid and symptoms resolved though uses his Albuterol inhaler on occasion. Recent URI and treated with Tessalon and abx (Doxycycline). [FreeTextEntry1] : \par   [CPAP] : CPAP [de-identified] : at 10 cm of water [Follow-Up - Routine Clinic] : a routine clinic follow-up of [Intermittent] : intermittent [None] : No associated symptoms are reported [Inhaled Short-Acting Beta-2 Agonists] : inhaled short-acting beta-2 agonists [Good Compliance] : good compliance with treatment [Good Tolerance] : good tolerance of treatment [Good Symptom Control] : good symptom control [CT Scan] : a CT scan [On ___] : performed on [unfilled] [Patient] : the patient [Indication ___] : for an indication of [unfilled] [FreeTextEntry5] : chest and sinus CT [FreeTextEntry8] : Bronchial thickening without consolidation or infiltrates; sinus thickening c/w sinusitis [TextEntry] : Chest CT from 10/23/21 with stable calcified granuloma and areas of scarring; overall without changed since 2016.

## 2024-01-11 ENCOUNTER — APPOINTMENT (OUTPATIENT)
Dept: PULMONOLOGY | Facility: CLINIC | Age: 79
End: 2024-01-11

## 2024-01-24 ENCOUNTER — APPOINTMENT (OUTPATIENT)
Age: 79
End: 2024-01-24

## 2024-02-05 ENCOUNTER — APPOINTMENT (OUTPATIENT)
Dept: ORTHOPEDIC SURGERY | Facility: CLINIC | Age: 79
End: 2024-02-05

## 2024-02-06 RX ORDER — PITAVASTATIN CALCIUM 2 MG/1
2 TABLET ORAL
Qty: 90 | Refills: 2 | Status: ACTIVE | COMMUNITY
Start: 2021-02-11 | End: 1900-01-01

## 2024-02-27 ENCOUNTER — RX RENEWAL (OUTPATIENT)
Age: 79
End: 2024-02-27

## 2024-03-06 ENCOUNTER — RESULT CHARGE (OUTPATIENT)
Age: 79
End: 2024-03-06

## 2024-03-07 ENCOUNTER — APPOINTMENT (OUTPATIENT)
Dept: CARDIOLOGY | Facility: CLINIC | Age: 79
End: 2024-03-07
Payer: MEDICARE

## 2024-03-07 ENCOUNTER — NON-APPOINTMENT (OUTPATIENT)
Age: 79
End: 2024-03-07

## 2024-03-07 VITALS
HEIGHT: 74 IN | WEIGHT: 208 LBS | HEART RATE: 72 BPM | SYSTOLIC BLOOD PRESSURE: 140 MMHG | DIASTOLIC BLOOD PRESSURE: 74 MMHG | RESPIRATION RATE: 14 BRPM | BODY MASS INDEX: 26.69 KG/M2

## 2024-03-07 DIAGNOSIS — I65.23 OCCLUSION AND STENOSIS OF BILATERAL CAROTID ARTERIES: ICD-10-CM

## 2024-03-07 DIAGNOSIS — I77.810 THORACIC AORTIC ECTASIA: ICD-10-CM

## 2024-03-07 PROCEDURE — G2211 COMPLEX E/M VISIT ADD ON: CPT

## 2024-03-07 PROCEDURE — 93000 ELECTROCARDIOGRAM COMPLETE: CPT

## 2024-03-07 PROCEDURE — 99214 OFFICE O/P EST MOD 30 MIN: CPT

## 2024-03-07 RX ORDER — BENZONATATE 150 MG/1
CAPSULE ORAL
Refills: 0 | Status: DISCONTINUED | COMMUNITY
End: 2024-03-07

## 2024-03-07 RX ORDER — DOXYCYCLINE 150 MG/1
TABLET, FILM COATED ORAL
Refills: 0 | Status: DISCONTINUED | COMMUNITY
End: 2024-03-07

## 2024-03-07 RX ORDER — DILTIAZEM HYDROCHLORIDE 240 MG/1
240 CAPSULE, EXTENDED RELEASE ORAL
Qty: 90 | Refills: 1 | Status: ACTIVE | COMMUNITY
Start: 2021-09-24 | End: 1900-01-01

## 2024-04-10 ENCOUNTER — OUTPATIENT (OUTPATIENT)
Dept: OUTPATIENT SERVICES | Facility: HOSPITAL | Age: 79
LOS: 1 days | End: 2024-04-10
Payer: MEDICARE

## 2024-04-10 DIAGNOSIS — R06.02 SHORTNESS OF BREATH: ICD-10-CM

## 2024-04-10 PROCEDURE — 71046 X-RAY EXAM CHEST 2 VIEWS: CPT | Mod: 26

## 2024-05-06 ENCOUNTER — APPOINTMENT (OUTPATIENT)
Dept: PULMONOLOGY | Facility: CLINIC | Age: 79
End: 2024-05-06
Payer: MEDICARE

## 2024-05-06 VITALS
HEART RATE: 74 BPM | RESPIRATION RATE: 14 BRPM | OXYGEN SATURATION: 98 % | DIASTOLIC BLOOD PRESSURE: 64 MMHG | SYSTOLIC BLOOD PRESSURE: 120 MMHG

## 2024-05-06 VITALS — HEIGHT: 74 IN | BODY MASS INDEX: 26.18 KG/M2 | WEIGHT: 204 LBS

## 2024-05-06 DIAGNOSIS — E66.3 OVERWEIGHT: ICD-10-CM

## 2024-05-06 DIAGNOSIS — R93.89 ABNORMAL FINDINGS ON DIAGNOSTIC IMAGING OF OTHER SPECIFIED BODY STRUCTURES: ICD-10-CM

## 2024-05-06 DIAGNOSIS — R06.02 SHORTNESS OF BREATH: ICD-10-CM

## 2024-05-06 DIAGNOSIS — Z86.16 PERSONAL HISTORY OF COVID-19: ICD-10-CM

## 2024-05-06 PROCEDURE — 94010 BREATHING CAPACITY TEST: CPT

## 2024-05-06 PROCEDURE — 99214 OFFICE O/P EST MOD 30 MIN: CPT | Mod: 25

## 2024-05-13 NOTE — ADDENDUM
[FreeTextEntry1] : Given the absence of any active cardiac symptoms, there is no absolute cardiac contraindication to Mr Ekstam undergoing eyelid lift surgery.

## 2024-05-13 NOTE — HISTORY OF PRESENT ILLNESS
[FreeTextEntry1] : Patient reports feeling well overall. Denies any cardiac symptoms. Remains physically active and has no exertional symptoms. He denies any chest pain, dizziness, syncope, near-syncope, SOB, edema, orthopnea or PND.  Tolerating CV medications.  Using CPAP consistently and follows up with pulm regularly.

## 2024-05-13 NOTE — REASON FOR VISIT
[FreeTextEntry1] : LINDSEY BERKOWITZ is a 78 year-old  M presents here for cardiac follow-up. His medical history includes: - HTN - MARILUZ, on CPAP - Carotid atherosclerosis - HLD - Dilated ascending aorta

## 2024-05-13 NOTE — ASSESSMENT
[FreeTextEntry1] : Laboratory data -----7/30/20---1/21/21---8/9/21---3/3/22--7/30/22--3/10/23--9/7/23 Chol---163------135------138------153-------155-------156------163 HDL----53--------45--------54-------53---------53--------69---------59 LDL-----91-------77--------73-------91---------87--------69----------88 trig------91-------65--------53-------46---------63--------90---------163  Carotid duplex 8/9/2023: Left: Minimal plaque: Peak 157: 1 to 19% highly tortuous Right: Mild ICA plaque: Peak 107: 20 to 40%  Carotid duplex 12/4/2020: Left carotid: Minimal atherosclerotic plaque Right carotid mild plaquing 1 to 49% stenosis  Stress test 4/22/22: Time: 9 minutes 15 seconds Heart rate: 129 beats minute (89%) Blood pressure: Hypertensive 198/72 mmHg. ECG: No ischemic ECG changes with occasional APCs.  Stress echo 9/20/18: Patient exercised 10 minutes 30 seconds (11 Mets). Peak heart rate 139 (93% maximum). Blood pressure began high but response is normal ECG showed no ischemic changes Baseline left ventricular ejection fraction 60-65% augmenting appropriately to 70-75% with exercise without any wall motion denies. Test is considered negative for ischemia.  Echocardiogram 8/3/2023 LVEF 55 to 60% Compared to prior study LVH is no longer present No evidence of pulmonary pretension Clinically insignificant mild valvular heart disease. Mild dilatation of ascending aorta and arch 3.7 cm unchanged.  Echocardiogram 4/27/2022: LVH with overall normal left ventricular systolic function. Ejection fraction 60 to 65% Mild RVH Mild left atrial enlargement Mild dilatation of the aortic root 3.7 cm  IMPRESSION:  1. Home blood pressure mildly elevated at 132/75. Most recent echocardiogram in 2023 shows resolution of LVH and mild aortic dilatation that is stable. He is tolerating Diltiazem. Has a hx of intolerance of ACEi and ARB.   2. Lipids not at goal.   3. No anginal symptoms. EKG without acute ischemic changes.   4. No exertional shortness of breath as previously reported.   5. MARILUZ consistently being treated wt CPAP.   6. Mild carotid disease. No neurological symptoms. No bruit on exam.   PLAN:  1. Increase Diltiazem to 240 mg daily for better BP control considering his hx of LVH and aortic dilatation. Monitor home BP and call us with average in 1 week. Continue home BP monitor and bring log to next OV.   2. Add Zetia for better lipid control. Given carotid dx and family hx of premature CAD, would recommend keeping LDL below 70.   3. Follow a low-fat, low-carbohydrate diet. Repeat lipid panel in 3 months.   4. Continue consistent use of CPAP and pulmonary F/U.   5.Continue off ASA given hx of esophageal ulcers. Consider monitoring of carotid dx every 2-3 years.   6. Consider repeat echocardiogram next year or sooner if indicated.   Clinical follow-up in 6 months or sooner if needed.

## 2024-05-13 NOTE — PHYSICAL EXAM
[Well Developed] : well developed [Well Nourished] : well nourished [No Acute Distress] : no acute distress [Normal Venous Pressure] : normal venous pressure [Normal Conjunctiva] : normal conjunctiva [No Carotid Bruit] : no carotid bruit [Normal S1, S2] : normal S1, S2 [No Rub] : no rub [No Gallop] : no gallop [Clear Lung Fields] : clear lung fields [Murmur] : murmur [No Respiratory Distress] : no respiratory distress  [Good Air Entry] : good air entry [Soft] : abdomen soft [Non Tender] : non-tender [No Masses/organomegaly] : no masses/organomegaly [Normal Bowel Sounds] : normal bowel sounds [Normal Gait] : normal gait [No Edema] : no edema [No Clubbing] : no clubbing [No Cyanosis] : no cyanosis [No Rash] : no rash [No Varicosities] : no varicosities [Moves all extremities] : moves all extremities [No Skin Lesions] : no skin lesions [No Focal Deficits] : no focal deficits [Normal Speech] : normal speech [Alert and Oriented] : alert and oriented [Normal memory] : normal memory [de-identified] : I/VI systolic murmur

## 2024-05-14 ENCOUNTER — NON-APPOINTMENT (OUTPATIENT)
Age: 79
End: 2024-05-14

## 2024-05-14 ENCOUNTER — APPOINTMENT (OUTPATIENT)
Dept: FAMILY MEDICINE | Facility: CLINIC | Age: 79
End: 2024-05-14
Payer: MEDICARE

## 2024-05-14 VITALS
DIASTOLIC BLOOD PRESSURE: 62 MMHG | HEIGHT: 75 IN | SYSTOLIC BLOOD PRESSURE: 132 MMHG | WEIGHT: 208 LBS | OXYGEN SATURATION: 95 % | BODY MASS INDEX: 25.86 KG/M2 | HEART RATE: 67 BPM

## 2024-05-14 DIAGNOSIS — R73.01 IMPAIRED FASTING GLUCOSE: ICD-10-CM

## 2024-05-14 DIAGNOSIS — E78.5 HYPERLIPIDEMIA, UNSPECIFIED: ICD-10-CM

## 2024-05-14 DIAGNOSIS — I10 ESSENTIAL (PRIMARY) HYPERTENSION: ICD-10-CM

## 2024-05-14 PROCEDURE — 99214 OFFICE O/P EST MOD 30 MIN: CPT

## 2024-05-14 PROCEDURE — G2211 COMPLEX E/M VISIT ADD ON: CPT

## 2024-05-16 RX ORDER — EZETIMIBE 10 MG/1
10 TABLET ORAL
Qty: 90 | Refills: 1 | Status: ACTIVE | COMMUNITY
Start: 2024-03-07 | End: 1900-01-01

## 2024-05-17 NOTE — HISTORY OF PRESENT ILLNESS
[CPAP] : CPAP [Follow-Up - Routine Clinic] : a routine clinic follow-up of [Intermittent] : intermittent [None] : No associated symptoms are reported [Inhaled Short-Acting Beta-2 Agonists] : inhaled short-acting beta-2 agonists [Good Compliance] : good compliance with treatment [Good Tolerance] : good tolerance of treatment [Good Symptom Control] : good symptom control [CT Scan] : a CT scan [On ___] : performed on [unfilled] [Patient] : the patient [Indication ___] : for an indication of [unfilled] [TextBox_4] : The patient was suffering from cough, nasal congestion, wheeze, and SOB. CXR was without infiltrates. He was treated with abx and steroids but without significant improvement. He subsequently underwent chest CT and sinus CT which revealed sinusitis and bronchial thickening but without pneumonia. He was started on steroids and Breo now with near resolution of symptoms. He is concerned that his CPAP therapy may have contributed to his infection though he has been on CPAP for many years.  Pt s/p Covid infection in 8/2022 with cough and was treated with Paxlovid and symptoms resolved though uses his Albuterol inhaler on occasion. Recent URI and treated with Tessalon and abx (Doxycycline). Since Oct 2020 pt with intermittent cough and has had evaluations with allergist, ENT, and another pulm. Pt was treated with Symbicort, doxycycline, Zyrtec, PPI, Tessalon Perles. Covid swabs and w/u with chest CT and CXR were unremarkable. Cough eventually resolved and is now at relative baseline.  [FreeTextEntry1] : \par   [de-identified] : at 10 cm of water [FreeTextEntry5] : chest and sinus CT [FreeTextEntry8] : Bronchial thickening without consolidation or infiltrates; sinus thickening c/w sinusitis [TextEntry] : Chest CT from 10/23/21 with stable calcified granuloma and areas of scarring; overall without changed since 2016.

## 2024-05-17 NOTE — PROCEDURE
[FreeTextEntry1] : PFTs 7/20/18 - essentially normal. Spirometry 1/6/20 - essentially normal  PFTs 6/11/21 - normal PFTs 2/3/22 - essentially normal with a borderline reduced FEV1 but overall reduced from baseline Spirometry 5/11/22 - normal and improved from previous PFTs 4/7/23 - normal. René 5/6/24 - normal and without change.

## 2024-05-17 NOTE — DISCUSSION/SUMMARY
[FreeTextEntry1] : #1. S/p Breo and Symbicort previously but now no longer on chronic BD therapy; symptomatically he is back to baseline even off of therapy; prior MCT was negative for asthma. #2. ProAir as needed. #3. Continue APAP for MARILUZ; encouraged continued improved compliance. #4. Replace equipment as needed; ordered 11/1/23 with new machine. The patient is using and benefitting from CPAP therapy. #5. F/u 5 months with CXR and compliance as he does not want to return sooner. #6. Repeat PFTs were essentially normal off of therapy; repeat spirometry was back to normal again. #7. Last CT from 10/2021 revealed stable changes going back to 2016; no further imaging studies are now required but will check CXR given recent URI. #8. Uses SoClean. #9. Pt had both Covid vaccines, booster x 2, and flu vaccines.  #10. Follows with ENT and is on Flonase/Astelin. #11. Followed with AI and was on Zyrtec but no longer. #12. Pt on PPI for GERD. #13. There is no pulmonary contraindication for the patient's upcoming eye surgery.  I would recommend that CPAP at 10 cm of water should be available to use post-op and with sleep. I would also recommend that Albuterol nebulizer should be available as needed as well as post op incentive spirometry, GI/DVT prophylaxis as needed, early ambulation as able, and adequate pain control. Would recommend that O2 saturation should be monitored during and after the procedure.  The patient expressed understanding and agreement with the above recommendations/plan and accepts responsibility to be compliant with recommended testing, therapies, and f/u visits. All relevant questions and concerns were addressed.

## 2024-05-17 NOTE — RESULTS/DATA
[TextEntry] : Chest CT imaging as above.  Previous methacholine challenge was not c/w asthma. PSG from 6/21/10 was c/w severe MARILUZ with an AHI of 38.4. CPAP titration study from 7/12/16 revealed an optimal pressure of 8 cm of water. CXR from 12/6/16 was clear. CXR from 11/29/21 was normal. CXR from 1/4/22 revealed minimal R atelectasis without acute disease. CXR from 4/10/24 with possible bibasilar atelectasis vs infiltrate though asymptomatic - reviewed results and films with patient. Compliance was % with a residual AHI of 1.5-4.4 which is normal on CPAP of 10 cm of water. The patient's overall compliance is 100% with a >4hr compliance of 100% on autoCPAP with a median and max pressure of 8.6 and 11.8 cm of water, respectively with a residual AHI of 4.7 which is normal. Could increase min pressure if needed.

## 2024-05-17 NOTE — CONSULT LETTER
[Dear  ___] : Dear  [unfilled], [Consult Letter:] : I had the pleasure of evaluating your patient, [unfilled]. [Please see my note below.] : Please see my note below. [Consult Closing:] : Thank you very much for allowing me to participate in the care of this patient.  If you have any questions, please do not hesitate to contact me. [Sincerely,] : Sincerely, [DrKeyla  ___] : Dr. ROONEY [DrKeyla ___] : Dr. ROONEY [FreeTextEntry3] : Clark Nevarez MD, FCCP, D. ABSM\par  Pulmonary and Sleep Medicine\par  [facility] at [place]

## 2024-05-27 PROBLEM — I10 ESSENTIAL HYPERTENSION: Status: ACTIVE | Noted: 2018-11-05

## 2024-05-27 PROBLEM — R73.01 IMPAIRED FASTING GLUCOSE: Status: ACTIVE | Noted: 2024-05-14

## 2024-05-27 NOTE — PHYSICAL EXAM
[No Acute Distress] : no acute distress [Well Nourished] : well nourished [Well-Appearing] : well-appearing [Normal Sclera/Conjunctiva] : normal sclera/conjunctiva [PERRL] : pupils equal round and reactive to light [No Respiratory Distress] : no respiratory distress  [No Accessory Muscle Use] : no accessory muscle use [Clear to Auscultation] : lungs were clear to auscultation bilaterally [Normal Rate] : normal rate  [Regular Rhythm] : with a regular rhythm [Normal S1, S2] : normal S1 and S2 [No Joint Swelling] : no joint swelling [Grossly Normal Strength/Tone] : grossly normal strength/tone [Coordination Grossly Intact] : coordination grossly intact [No Focal Deficits] : no focal deficits [Speech Grossly Normal] : speech grossly normal [Memory Grossly Normal] : memory grossly normal [Normal Mood] : the mood was normal

## 2024-05-27 NOTE — HISTORY OF PRESENT ILLNESS
[FreeTextEntry1] : NOTE FOR TRIP [de-identified] : MR. BERKOWITZ IS A PLEASANT 77 YO PRESENTING FOR FOLLOW-UP.  GOING TO [place] IN A WEEK.  NEEDS NOTE FOR CAR INSURANCE.  SAW CARDIOLOGY IN [place].  DILTIAZEM INCREASED.  ZETIA WAS STARTED FOR CHOLESTEROL.  IS USING CPAP CONSISTENTLY FOR SLEEP APNEA.  INTERMITTENT ASTHMA - CURRENTLY ASYMPTOMATIC.  FOLLOWS WITH OPHTHALMOLOGY.  PLANS FOR PTOSIS SURGERY.  [facility].  NO ALBUTEROL IN MONTHS.  CHRONIC HISTORY OF HYPERTENSION, HYPERLIPIDEMIA, MARILUZ, CAROTID ATHEROSCLEROSIS AND ASTHMA.  FEELS WELL TODAY.

## 2024-05-27 NOTE — PLAN
[FreeTextEntry1] : CARDIOLOGY CONSULTANT NOTE FROM 3/7/24 REVIEWED PULMONOLOGY CONSULTANT NOTE FROM 5/6/24 APPRECIATED HEALTHY DIET AND LIFESTYLE MODIFICATIONS HEALTHY WEIGHT LOSS  MONITOR LAB WORK INCLUDING LIPIDS AND A1C CONTINUE LIVALO 2 MG DAILY FOLLOW-UP ALL SPECIALISTS AS DIRECTED FOLLOW-UP FOR CPE CALL WITH ANY QUESTIONS, CONCERNS OR CHANGES  ADDITIONAL TIME SPENT IN CHART REVIEW, NOTE CREATION, NOTE FOR TRIP AND COUNSELING

## 2024-05-29 ENCOUNTER — APPOINTMENT (OUTPATIENT)
Dept: FAMILY MEDICINE | Facility: CLINIC | Age: 79
End: 2024-05-29
Payer: MEDICARE

## 2024-05-29 ENCOUNTER — NON-APPOINTMENT (OUTPATIENT)
Age: 79
End: 2024-05-29

## 2024-05-29 VITALS
SYSTOLIC BLOOD PRESSURE: 138 MMHG | HEART RATE: 77 BPM | OXYGEN SATURATION: 96 % | TEMPERATURE: 100.3 F | WEIGHT: 210 LBS | DIASTOLIC BLOOD PRESSURE: 64 MMHG | HEIGHT: 75 IN | BODY MASS INDEX: 26.11 KG/M2

## 2024-05-29 DIAGNOSIS — J45.20 MILD INTERMITTENT ASTHMA, UNCOMPLICATED: ICD-10-CM

## 2024-05-29 DIAGNOSIS — G47.33 OBSTRUCTIVE SLEEP APNEA (ADULT) (PEDIATRIC): ICD-10-CM

## 2024-05-29 DIAGNOSIS — Z01.818 ENCOUNTER FOR OTHER PREPROCEDURAL EXAMINATION: ICD-10-CM

## 2024-05-29 DIAGNOSIS — H02.409 UNSPECIFIED PTOSIS OF UNSPECIFIED EYELID: ICD-10-CM

## 2024-05-29 PROCEDURE — 93000 ELECTROCARDIOGRAM COMPLETE: CPT

## 2024-05-29 PROCEDURE — 99214 OFFICE O/P EST MOD 30 MIN: CPT

## 2024-05-29 NOTE — PLAN
[FreeTextEntry1] : PRE-OPERATIVE TESTING TO BE DONE EKG: NSR AT 73 BPM, NO ACUTE ST-T WAVE CHANGES; SIMILAR TO PRIOR (INTERPRETED TODAY) SEE CARDIOLOGY CLEARANCE AND RECOMMENDATIONS; REVIEWED NOTE 3/7/24 SEE PULMONOLOGY CLEARANCE AND RECOMMENDATIONS; REVIEWED NOTE 5/6/24 GOOD EXERCISE CAPACITY GI/DVT PROPHYLAXIS PER SURGERY AVOIDANCE OF NSAIDS, VITAMINS AND ASPIRIN CONTAINING PRODUCTS PRIOR TO SURGERY WILL SWAB FOR FLU, COVID, RSV SUPPORTIVE CARE: REST, FLUIDS, STEAM NASAL SPRAY IF CLEARED TO USE BY OPHTHALMOLOGIST TYLENOL PRN FURTHER RECOMMENDATIONS PENDING RESULTS UNABLE TO CLEAR AT THIS TIME.  WILL NEED RE-EVALUATION PRIOR TO PROCEDURE CALL WITH ANY QUESTIONS, CONCERNS OR CHANGES PRIOR TO PROCEDURE SUPPORT PROVIDED FOLLOW-UP POST-OPERATIVELY AS WELL

## 2024-05-29 NOTE — HISTORY OF PRESENT ILLNESS
[Asthma] : asthma [Sleep Apnea] : sleep apnea [No Adverse Anesthesia Reaction] : no adverse anesthesia reaction in self or family member [(Patient denies any chest pain, claudication, dyspnea on exertion, orthopnea, palpitations or syncope)] : Patient denies any chest pain, claudication, dyspnea on exertion, orthopnea, palpitations or syncope [Aortic Stenosis] : no aortic stenosis [Atrial Fibrillation] : no atrial fibrillation [Coronary Artery Disease] : no coronary artery disease [Recent Myocardial Infarction] : no recent myocardial infarction [Implantable Device/Pacemaker] : no implantable device/pacemaker [COPD] : no COPD [Smoker] : not a smoker [Chronic Anticoagulation] : no chronic anticoagulation [Chronic Kidney Disease] : no chronic kidney disease [Diabetes] : no diabetes [FreeTextEntry1] : LEFT UPPER LID BELPHAROPLASTY [FreeTextEntry2] : JUNE 6, 2024 [FreeTextEntry3] : DR. SIDDIQUI [FreeTextEntry4] : MR. BERKOWITZ IS A PLEASANT 77 YO PRESENTING FOR MEDICAL CLEARANCE FOR UPCOMING EYELID SURGERY.  IMPAIRS VISION.  NO HISTORY OF MI, STROKE OR SEIZURE.  NO PERSONAL OR FAMILY HISTORY OF BLOOD OR CLOTTING DISORDERS.  DENIES EASY BLEEDING OR BRUISING.  IS ABLE TO WALK MULTIPLE BLOCKS AND GO UP MULTIPLE FLIGHTS OF STAIRS WITHOUT DIFFICULTY.   HAS HAD NO HEADACHE, DIZZINESS, CHEST PAIN, SHORTNESS OF BREATH, GI OR URINARY COMPLAINTS.  CHRONIC HISTORY OF HYPERTENSION, HYPERLIPIDEMIA, MARILUZ, CAROTID ATHEROSCLEROSIS AND ASTHMA.  RECENTLY RETURNED HOME FROM TRAVELING.  SYMPTOMS STARTED TWO DAYS AGO WITH COUGH AND NASAL CONGESTION.  ALSO FEVER STARTED YESTURDAY .1.  NO MEDICATIONS TAKEN FOR SYMPTOMS.  THROAT IRRITATION.  NO N/V/D.  NO RASHES.  NO KNOWN SICK CONTACTS.  DID NOT SWAB FOR COVID.  NOT WORSENING. [FreeTextEntry7] : SEE CARDIOLOGY CLEARANCE

## 2024-05-29 NOTE — PHYSICAL EXAM
[No Acute Distress] : no acute distress [Well Nourished] : well nourished [Well-Appearing] : well-appearing [Normal Sclera/Conjunctiva] : normal sclera/conjunctiva [PERRL] : pupils equal round and reactive to light [Normal Outer Ear/Nose] : the outer ears and nose were normal in appearance [Normal Oropharynx] : the oropharynx was normal [Normal TMs] : both tympanic membranes were normal [No Lymphadenopathy] : no lymphadenopathy [Supple] : supple [No Respiratory Distress] : no respiratory distress  [No Accessory Muscle Use] : no accessory muscle use [Clear to Auscultation] : lungs were clear to auscultation bilaterally [Normal Rate] : normal rate  [Regular Rhythm] : with a regular rhythm [Normal S1, S2] : normal S1 and S2 [Soft] : abdomen soft [Non Tender] : non-tender [Normal Bowel Sounds] : normal bowel sounds [No Joint Swelling] : no joint swelling [Grossly Normal Strength/Tone] : grossly normal strength/tone [No Rash] : no rash [Coordination Grossly Intact] : coordination grossly intact [No Focal Deficits] : no focal deficits [Speech Grossly Normal] : speech grossly normal [Memory Grossly Normal] : memory grossly normal [Normal Mood] : the mood was normal

## 2024-05-29 NOTE — REVIEW OF SYSTEMS
[Fever] : fever [Fatigue] : fatigue [Nasal Discharge] : nasal discharge [Cough] : cough [Negative] : Heme/Lymph [Earache] : no earache [Sore Throat] : no sore throat [Shortness Of Breath] : no shortness of breath [Wheezing] : no wheezing

## 2024-05-30 ENCOUNTER — APPOINTMENT (OUTPATIENT)
Dept: FAMILY MEDICINE | Facility: CLINIC | Age: 79
End: 2024-05-30
Payer: MEDICARE

## 2024-05-30 DIAGNOSIS — R05.9 COUGH, UNSPECIFIED: ICD-10-CM

## 2024-05-30 DIAGNOSIS — U07.1 COVID-19: ICD-10-CM

## 2024-05-30 LAB
INFLUENZA A RESULT: NOT DETECTED
INFLUENZA B RESULT: NOT DETECTED
RESP SYN VIRUS RESULT: NOT DETECTED
SARS-COV-2 RESULT: DETECTED

## 2024-05-30 PROCEDURE — 99213 OFFICE O/P EST LOW 20 MIN: CPT

## 2024-05-31 LAB
HBA1C MFR BLD HPLC: 5.4
LDLC SERPL CALC-MCNC: 68

## 2024-06-01 PROBLEM — R05.9 COUGH: Status: ACTIVE | Noted: 2024-05-29

## 2024-06-01 PROBLEM — U07.1 COVID-19 VIRUS INFECTION: Status: ACTIVE | Noted: 2024-06-01

## 2024-06-01 NOTE — REVIEW OF SYSTEMS
[Fever] : no fever [Chills] : no chills [Abdominal Pain] : no abdominal pain [Diarrhea] : diarrhea [Vomiting] : no vomiting [Negative] : Cardiovascular [FreeTextEntry4] : SEE HPI [FreeTextEntry6] : SEE HPI

## 2024-06-01 NOTE — HISTORY OF PRESENT ILLNESS
[Home] : at home, [unfilled] , at the time of the visit. [Medical Office: ([facility])___] : at the medical office located in  [Verbal consent obtained from patient] : the patient, [unfilled] [FreeTextEntry1] : F/U COVID TEST [de-identified] : MR. BERKOWITZ IS A PLEASANT 77 YO PRESENTING FOR FOLLOW-UP OF SWAB.  TESTED POSITIVE FOR COVID.  SYMPTOMS STARTED ON SUNDAY.  NO FEVER TODAY.  FEELS BETTER TODAY.  IS TIRED.  "I FEEL VERY GOOD RIGHT NOW".  SOME COUGH BUT IMPROVED.  A LITTLE CONGESTION.  NO SORE THROAT.  NO N/V.  ONE EPISODE OF DIARRHEA YESTURDAY.  NO SHORTNESS OF BREATH OR CHEST PAIN.  TAKING TYLENOL.  PRIOR COVID TOOK PAXLOVID.  DOES NOT WANT TO TAKE PAXLOVID NOW.

## 2024-06-01 NOTE — PLAN
[FreeTextEntry1] : RESULTS OF SWABBED REVIEWED; +COVID ISOLATION GUIDELINES REVIEWED SUPPORTIVE CARE: REST, FLUIDS, STEAM DISCUSSED BREATHING EXERCISES AND AMBULATION DISCUSSED ZINC, VITAMIN C AND VITAMIN D DOES NOT WANT PAXLOVID - PURPOSE REVIEWED CALL WITH ANY QUESTIONS, CONCERNS OR CHANGES  AWARE WHEN TO SEEK EMERGENT CARE ADDITIONAL TIME SPENT IN COUNSELING AND NOTE CREATION

## 2024-06-06 ENCOUNTER — APPOINTMENT (OUTPATIENT)
Dept: DERMATOLOGY | Facility: CLINIC | Age: 79
End: 2024-06-06
Payer: MEDICARE

## 2024-06-06 PROCEDURE — 11900 INJECT SKIN LESIONS </W 7: CPT

## 2024-06-06 PROCEDURE — 10140 I&D HMTMA SEROMA/FLUID COLLJ: CPT

## 2024-06-06 PROCEDURE — 99213 OFFICE O/P EST LOW 20 MIN: CPT | Mod: 25

## 2024-06-14 RX ORDER — ALBUTEROL SULFATE 90 UG/1
108 (90 BASE) INHALANT RESPIRATORY (INHALATION)
Qty: 1 | Refills: 3 | Status: ACTIVE | COMMUNITY
Start: 2018-07-20 | End: 1900-01-01

## 2024-08-15 ENCOUNTER — APPOINTMENT (OUTPATIENT)
Dept: RADIOLOGY | Facility: CLINIC | Age: 79
End: 2024-08-15
Payer: MEDICARE

## 2024-08-15 PROCEDURE — 71046 X-RAY EXAM CHEST 2 VIEWS: CPT | Mod: 26

## 2024-08-21 ENCOUNTER — NON-APPOINTMENT (OUTPATIENT)
Age: 79
End: 2024-08-21

## 2024-08-26 ENCOUNTER — APPOINTMENT (OUTPATIENT)
Dept: FAMILY MEDICINE | Facility: CLINIC | Age: 79
End: 2024-08-26
Payer: MEDICARE

## 2024-08-26 VITALS — SYSTOLIC BLOOD PRESSURE: 132 MMHG | DIASTOLIC BLOOD PRESSURE: 66 MMHG

## 2024-08-26 VITALS
HEIGHT: 75 IN | WEIGHT: 213 LBS | BODY MASS INDEX: 26.49 KG/M2 | DIASTOLIC BLOOD PRESSURE: 64 MMHG | OXYGEN SATURATION: 96 % | SYSTOLIC BLOOD PRESSURE: 140 MMHG | HEART RATE: 64 BPM

## 2024-08-26 DIAGNOSIS — H02.409 UNSPECIFIED PTOSIS OF UNSPECIFIED EYELID: ICD-10-CM

## 2024-08-26 DIAGNOSIS — S39.012A STRAIN OF MUSCLE, FASCIA AND TENDON OF LOWER BACK, INITIAL ENCOUNTER: ICD-10-CM

## 2024-08-26 DIAGNOSIS — Z01.818 ENCOUNTER FOR OTHER PREPROCEDURAL EXAMINATION: ICD-10-CM

## 2024-08-26 DIAGNOSIS — Z87.39 PERSONAL HISTORY OF OTHER DISEASES OF THE MUSCULOSKELETAL SYSTEM AND CONNECTIVE TISSUE: ICD-10-CM

## 2024-08-26 DIAGNOSIS — R79.89 OTHER SPECIFIED ABNORMAL FINDINGS OF BLOOD CHEMISTRY: ICD-10-CM

## 2024-08-26 DIAGNOSIS — U07.1 COVID-19: ICD-10-CM

## 2024-08-26 PROCEDURE — 36415 COLL VENOUS BLD VENIPUNCTURE: CPT

## 2024-08-26 PROCEDURE — 99214 OFFICE O/P EST MOD 30 MIN: CPT

## 2024-08-27 ENCOUNTER — APPOINTMENT (OUTPATIENT)
Dept: PULMONOLOGY | Facility: CLINIC | Age: 79
End: 2024-08-27

## 2024-08-27 ENCOUNTER — APPOINTMENT (OUTPATIENT)
Dept: PULMONOLOGY | Facility: CLINIC | Age: 79
End: 2024-08-27
Payer: MEDICARE

## 2024-08-27 VITALS
HEART RATE: 63 BPM | OXYGEN SATURATION: 96 % | RESPIRATION RATE: 16 BRPM | SYSTOLIC BLOOD PRESSURE: 126 MMHG | DIASTOLIC BLOOD PRESSURE: 74 MMHG

## 2024-08-27 VITALS — HEIGHT: 74.5 IN | BODY MASS INDEX: 26.66 KG/M2 | WEIGHT: 210 LBS

## 2024-08-27 DIAGNOSIS — R93.89 ABNORMAL FINDINGS ON DIAGNOSTIC IMAGING OF OTHER SPECIFIED BODY STRUCTURES: ICD-10-CM

## 2024-08-27 DIAGNOSIS — Z86.16 PERSONAL HISTORY OF COVID-19: ICD-10-CM

## 2024-08-27 DIAGNOSIS — J45.20 MILD INTERMITTENT ASTHMA, UNCOMPLICATED: ICD-10-CM

## 2024-08-27 DIAGNOSIS — Z01.89 ENCOUNTER FOR OTHER SPECIFIED SPECIAL EXAMINATIONS: ICD-10-CM

## 2024-08-27 DIAGNOSIS — E66.3 OVERWEIGHT: ICD-10-CM

## 2024-08-27 DIAGNOSIS — R06.02 SHORTNESS OF BREATH: ICD-10-CM

## 2024-08-27 LAB
BASOPHILS # BLD AUTO: 0.06 K/UL
BASOPHILS NFR BLD AUTO: 1 %
EOSINOPHIL # BLD AUTO: 0.3 K/UL
EOSINOPHIL NFR BLD AUTO: 5.1 %
HCT VFR BLD CALC: 44.3 %
HGB BLD-MCNC: 13.9 G/DL
IMM GRANULOCYTES NFR BLD AUTO: 0.2 %
LYMPHOCYTES # BLD AUTO: 1.76 K/UL
LYMPHOCYTES NFR BLD AUTO: 29.9 %
MAN DIFF?: NORMAL
MCHC RBC-ENTMCNC: 30.3 PG
MCHC RBC-ENTMCNC: 31.4 GM/DL
MCV RBC AUTO: 96.5 FL
MONOCYTES # BLD AUTO: 0.38 K/UL
MONOCYTES NFR BLD AUTO: 6.5 %
NEUTROPHILS # BLD AUTO: 3.37 K/UL
NEUTROPHILS NFR BLD AUTO: 57.3 %
PLATELET # BLD AUTO: 220 K/UL
RBC # BLD: 4.59 M/UL
RBC # FLD: 13.8 %
WBC # FLD AUTO: 5.88 K/UL

## 2024-08-27 PROCEDURE — 99214 OFFICE O/P EST MOD 30 MIN: CPT | Mod: 25

## 2024-08-27 PROCEDURE — 94010 BREATHING CAPACITY TEST: CPT

## 2024-08-27 NOTE — CONSULT LETTER
[Dear  ___] : Dear  [unfilled], [Consult Letter:] : I had the pleasure of evaluating your patient, [unfilled]. [Please see my note below.] : Please see my note below. [Consult Closing:] : Thank you very much for allowing me to participate in the care of this patient.  If you have any questions, please do not hesitate to contact me. [Sincerely,] : Sincerely, [DrKeyla  ___] : Dr. ROONEY [DrKeyla ___] : Dr. ROONEY [___] : [unfilled] [FreeTextEntry3] : Clark Nevarez MD, FCCP, D. ABSM\par  Pulmonary and Sleep Medicine\par  [facility] at [place]

## 2024-08-27 NOTE — HISTORY OF PRESENT ILLNESS
[CPAP] : CPAP [Intermittent] : intermittent [Follow-Up - Routine Clinic] : a routine clinic follow-up of [None] : No associated symptoms are reported [Inhaled Short-Acting Beta-2 Agonists] : inhaled short-acting beta-2 agonists [Good Compliance] : good compliance with treatment [Good Tolerance] : good tolerance of treatment [Good Symptom Control] : good symptom control [CT Scan] : a CT scan [On ___] : performed on [unfilled] [Indication ___] : for an indication of [unfilled] [Patient] : the patient [TextBox_4] : The patient was suffering from cough, nasal congestion, wheeze, and SOB. CXR was without infiltrates. He was treated with abx and steroids but without significant improvement. He subsequently underwent chest CT and sinus CT which revealed sinusitis and bronchial thickening but without pneumonia. He was started on steroids and Breo now with near resolution of symptoms. He is concerned that his CPAP therapy may have contributed to his infection though he has been on CPAP for many years.  Pt s/p Covid infection in 8/2022 with cough and was treated with Paxlovid and symptoms resolved though uses his Albuterol inhaler on occasion. Prior URI and treated with Tessalon and abx (Doxycycline). Since Oct 2020 pt with intermittent cough and has had evaluations with allergist, ENT, and another pulm. Pt was treated with Symbicort, doxycycline, Zyrtec, PPI, Tessalon Perles. Covid swabs and w/u with chest CT and CXR were unremarkable. Cough eventually resolved and is now at relative baseline.  [FreeTextEntry1] : \par   [de-identified] : at 10 cm of water [FreeTextEntry5] : chest and sinus CT [FreeTextEntry8] : Bronchial thickening without consolidation or infiltrates; sinus thickening c/w sinusitis [TextEntry] : Chest CT from 10/23/21 with stable calcified granuloma and areas of scarring; overall without changed since 2016.

## 2024-08-27 NOTE — DISCUSSION/SUMMARY
[FreeTextEntry1] : #1. S/p Breo and Symbicort previously but now no longer on chronic BD therapy; symptomatically he is back to baseline even off of therapy; prior MCT was negative for asthma. Doing well off inhalers. #2. ProAir as needed. #3. Continue APAP for MARILUZ; encouraged continued improved compliance. #4. Replace equipment as needed; ordered 11/1/23 with new machine. The patient is using and benefitting from CPAP therapy but now with mild residual MARILUZ with an AHI of 7.4 since he changed his mask. Gave pt Resmed AirFit P10 nasal pillows with which he did well before and then will obtain repeat compliance. #5. F/u in 1 month with chest CT and compliance on Resmed nasal pillows. #6. Repeat PFTs were essentially normal off of therapy; repeat now remain normal. #7. Last CT from 10/2021 revealed stable changes going back to 2016; no further imaging studies are now required but CXR with possible new L nodule so will again obtain chest CT for further evaluation. #8. Uses SoClean. #9. Pt had both Covid vaccines, booster x 2, and flu vaccines.  #10. Follows with ENT and is on Flonase/Astelin. #11. Followed with AI and was on Zyrtec but no longer. #12. Pt on PPI for GERD. #13. There is no pulmonary contraindication for the patient's upcoming eye surgery.  I would recommend that CPAP at 10 cm of water should be available to use post-op and with sleep. I would also recommend that Albuterol nebulizer should be available as needed as well as post op incentive spirometry, GI/DVT prophylaxis as needed, early ambulation as able, and adequate pain control. Would recommend that O2 saturation should be monitored during and after the procedure.  The patient expressed understanding and agreement with the above recommendations/plan and accepts responsibility to be compliant with recommended testing, therapies, and f/u visits. All relevant questions and concerns were addressed.

## 2024-08-27 NOTE — END OF VISIT
[Time Spent: ___ minutes] : I have spent [unfilled] minutes of time on the encounter which excludes teaching and separately reported services. [TextEntry] : Discussed with pt at length regarding SOBOE, abnormal CT, weight issues, asthma/hyper-reactive airways, MARILUZ; reviewed w/u with pt as above.

## 2024-08-27 NOTE — PROCEDURE
[FreeTextEntry1] : PFTs 7/20/18 - essentially normal. Spirometry 1/6/20 - essentially normal  PFTs 6/11/21 - normal PFTs 2/3/22 - essentially normal with a borderline reduced FEV1 but overall reduced from baseline Spirometry 5/11/22 - normal and improved from previous PFTs 4/7/23 - normal. René 5/6/24 - normal and without change. René 8/27/24 - normal and near baseline but slightly improved from prior.

## 2024-08-29 ENCOUNTER — APPOINTMENT (OUTPATIENT)
Dept: CARDIOLOGY | Facility: CLINIC | Age: 79
End: 2024-08-29
Payer: MEDICARE

## 2024-08-29 VITALS
HEART RATE: 62 BPM | HEIGHT: 74 IN | BODY MASS INDEX: 26.95 KG/M2 | WEIGHT: 210 LBS | SYSTOLIC BLOOD PRESSURE: 144 MMHG | DIASTOLIC BLOOD PRESSURE: 80 MMHG | RESPIRATION RATE: 14 BRPM

## 2024-08-29 DIAGNOSIS — G47.33 OBSTRUCTIVE SLEEP APNEA (ADULT) (PEDIATRIC): ICD-10-CM

## 2024-08-29 DIAGNOSIS — Z01.818 ENCOUNTER FOR OTHER PREPROCEDURAL EXAMINATION: ICD-10-CM

## 2024-08-29 DIAGNOSIS — I10 ESSENTIAL (PRIMARY) HYPERTENSION: ICD-10-CM

## 2024-08-29 DIAGNOSIS — E78.5 HYPERLIPIDEMIA, UNSPECIFIED: ICD-10-CM

## 2024-08-29 DIAGNOSIS — I77.810 THORACIC AORTIC ECTASIA: ICD-10-CM

## 2024-08-29 DIAGNOSIS — I51.7 CARDIOMEGALY: ICD-10-CM

## 2024-08-29 PROCEDURE — 93000 ELECTROCARDIOGRAM COMPLETE: CPT

## 2024-08-29 PROCEDURE — 99214 OFFICE O/P EST MOD 30 MIN: CPT

## 2024-08-29 PROCEDURE — G2211 COMPLEX E/M VISIT ADD ON: CPT

## 2024-09-01 NOTE — PHYSICAL EXAM
[Well Developed] : well developed [Well Nourished] : well nourished [No Acute Distress] : no acute distress [Normal Venous Pressure] : normal venous pressure [No Carotid Bruit] : no carotid bruit [Normal S1, S2] : normal S1, S2 [No Rub] : no rub [No Gallop] : no gallop [Murmur] : murmur [Clear Lung Fields] : clear lung fields [Good Air Entry] : good air entry [No Respiratory Distress] : no respiratory distress  [Soft] : abdomen soft [Non Tender] : non-tender [No Masses/organomegaly] : no masses/organomegaly [Normal Bowel Sounds] : normal bowel sounds [Normal Gait] : normal gait [No Edema] : no edema [No Cyanosis] : no cyanosis [No Clubbing] : no clubbing [No Varicosities] : no varicosities [No Rash] : no rash [No Skin Lesions] : no skin lesions [Moves all extremities] : moves all extremities [No Focal Deficits] : no focal deficits [Normal Speech] : normal speech [Alert and Oriented] : alert and oriented [Normal memory] : normal memory [de-identified] : L eye ptosis [de-identified] : I/VI systolic murmur

## 2024-09-01 NOTE — PHYSICAL EXAM
[Well Developed] : well developed [Well Nourished] : well nourished [No Acute Distress] : no acute distress [Normal Venous Pressure] : normal venous pressure [No Carotid Bruit] : no carotid bruit [Normal S1, S2] : normal S1, S2 [No Rub] : no rub [No Gallop] : no gallop [Murmur] : murmur [Clear Lung Fields] : clear lung fields [Good Air Entry] : good air entry [No Respiratory Distress] : no respiratory distress  [Soft] : abdomen soft [Non Tender] : non-tender [No Masses/organomegaly] : no masses/organomegaly [Normal Bowel Sounds] : normal bowel sounds [Normal Gait] : normal gait [No Edema] : no edema [No Cyanosis] : no cyanosis [No Clubbing] : no clubbing [No Varicosities] : no varicosities [No Rash] : no rash [No Skin Lesions] : no skin lesions [Moves all extremities] : moves all extremities [No Focal Deficits] : no focal deficits [Normal Speech] : normal speech [Alert and Oriented] : alert and oriented [Normal memory] : normal memory [de-identified] : L eye ptosis [de-identified] : I/VI systolic murmur

## 2024-09-01 NOTE — REASON FOR VISIT
[FreeTextEntry1] : LINDSEY BERKOWITZ is a 78 year-old M presents here for cardiac follow-up and risk stratification for left upper lid blepharoplasty.  His medical history includes: - HTN - MARILUZ, on CPAP - Carotid atherosclerosis - HLD - Dilated ascending aorta

## 2024-09-01 NOTE — ASSESSMENT
[FreeTextEntry1] : EKG:  SR, normal axis and intervals, no significant ST or T wave abnormalities.  Laboratory data -----7/30/20---1/21/21---8/9/21---3/3/22--7/30/22--3/10/23--9/7/23--8/22/24 Chol---163------135------138------153-------155-------156------163-------109 HDL----53--------45--------54-------53---------53--------69---------59-------45 LDL-----91-------77--------73-------91---------87--------69----------88-------54 trig------91-------65--------53-------46---------63--------90---------163-------50  Carotid duplex 8/9/2023: Left: Minimal plaque: Peak 157: 1 to 19% highly tortuous Right: Mild ICA plaque: Peak 107: 20 to 40%  Carotid duplex 12/4/2020: Left carotid: Minimal atherosclerotic plaque Right carotid mild plaquing 1 to 49% stenosis  Stress test 4/22/22: Time: 9 minutes 15 seconds Heart rate: 129 beats minute (89%) Blood pressure: Hypertensive 198/72 mmHg. ECG: No ischemic ECG changes with occasional APCs.  Stress echo 9/20/18: Patient exercised 10 minutes 30 seconds (11 Mets). Peak heart rate 139 (93% maximum). Blood pressure began high but response is normal ECG showed no ischemic changes Baseline left ventricular ejection fraction 60-65% augmenting appropriately to 70-75% with exercise without any wall motion denies. Test is considered negative for ischemia.  Echocardiogram 8/3/2023 LVEF 55 to 60% Compared to prior study LVH is no longer present No evidence of pulmonary pretension Clinically insignificant mild valvular heart disease. Mild dilatation of ascending aorta and arch 3.7 cm unchanged.  Echocardiogram 4/27/2022: LVH with overall normal left ventricular systolic function. Ejection fraction 60 to 65% Mild RVH Mild left atrial enlargement Mild dilatation of the aortic root 3.7 cm  IMPRESSION: Preoperative evaluation for a blepharoplasty of the left eye.  1. Home blood pressures well controlled. Most recent echocardiogram in 2023 shows resolution of LVH and mild aortic dilatation that is stable. He is tolerating Diltiazem. Has a hx of intolerance of ACEi and ARB.   2. Lipids well controlled on Zetia.   3. No anginal symptoms. EKG without acute ischemic changes.   4. No exertional shortness of breath as previously reported.   5. MARILUZ consistently being treated wtih CPAP.   6. Mild carotid disease. No neurological symptoms. No bruit on exam.   PLAN:  1. Continue current CV medications at current doses including Diltiazem to 240 mg daily for HTN. Advised to keep a BP log and bring it to office visits given occasional situational elevations.   2. Continue Zetia. Given carotid dx and family hx of premature CAD, would recommend keeping LDL below 70.   3. Follow a low-fat, low-carbohydrate diet. Repeat lipid panel through PMD.   4. Continue consistent use of CPAP and pulmonary F/U.   5.Continue off ASA given hx of esophageal ulcers. Consider monitoring of carotid dx every 2-3 years.   6. Consider repeat echocardiogram next year or sooner if indicated.   Clinical follow-up in 6 months or sooner if needed.  EKG obtained to assist in diagnosis and management of assessed problem(s).  Given the absence of active cardiac symptoms and his stable cardiac pattern, there is no absolute cardiac contraindication to Mr. Vásquez undergoing a left blepharoplasty. Diltiazem should be taken per his regular schedule with small sips of water.

## 2024-09-01 NOTE — HISTORY OF PRESENT ILLNESS
[FreeTextEntry1] : Patient reports feeling well overall. Denies any cardiac symptoms. Remains physically active and has no exertional symptoms. He denies any chest pain, dizziness, syncope, near-syncope, SOB, edema, orthopnea or PND.  Tolerating CV medications. Diltiazem was increased on his last visit. Home BP average is 123/66. BP elevated here however he has a hx of BP elevations at MD visits.  Using CPAP consistently and follows up with pulm regularly.   Had bilateral blepharoplasty in the past which were tolerated well and without cardiac complications.

## 2024-09-04 NOTE — HISTORY OF PRESENT ILLNESS
[No Pertinent Cardiac History] : no history of aortic stenosis, atrial fibrillation, coronary artery disease, recent myocardial infarction, or implantable device/pacemaker [Asthma] : asthma [Sleep Apnea] : sleep apnea [No Adverse Anesthesia Reaction] : no adverse anesthesia reaction in self or family member [(Patient denies any chest pain, claudication, dyspnea on exertion, orthopnea, palpitations or syncope)] : Patient denies any chest pain, claudication, dyspnea on exertion, orthopnea, palpitations or syncope [COPD] : no COPD [Smoker] : not a smoker [Chronic Anticoagulation] : no chronic anticoagulation [Chronic Kidney Disease] : no chronic kidney disease [Diabetes] : no diabetes [FreeTextEntry1] : LEFT UPPER LID BLEPHAROPLASTY [FreeTextEntry2] : SEPTEMBER 5, 2024 [FreeTextEntry3] : DR. SIDDIQUI [FreeTextEntry4] : MR. BERKOWITZ IS A PLEASANT 77 YO PRESENTING FOR MEDICAL CLEARANCE FOR UPCOMING EYELID SURGERY. IMPAIRS VISION. NO HISTORY OF MI, STROKE OR SEIZURE. NO PERSONAL OR FAMILY HISTORY OF BLOOD OR CLOTTING DISORDERS. DENIES EASY BLEEDING OR BRUISING. IS ABLE TO WALK MULTIPLE BLOCKS AND GO UP MULTIPLE FLIGHTS OF STAIRS WITHOUT DIFFICULTY. HAS HAD NO HEADACHE, DIZZINESS, CHEST PAIN, SHORTNESS OF BREATH, GI OR URINARY COMPLAINTS. CHRONIC HISTORY OF HYPERTENSION, HYPERLIPIDEMIA, MARILUZ, CAROTID ATHEROSCLEROSIS AND ASTHMA. [FreeTextEntry7] : CARDIOLOGY CLEARANCE 3/7/24 - SEEING FOR NEW CLEARANCE THIS WEEK PULMONOLOGY CLEARANCE 5/6/24 - SEEING FOR NEW CLEARANCE THIS WEEK

## 2024-09-04 NOTE — PLAN
[FreeTextEntry1] : OPTIMIZED MEDICALLY FOR PROPOSED SURGICAL PROCEDURE PENDING PRE-OPERATIVE TESTING REVIEW IS SEEING CARDIOLOGY AND PULMONOLOGY THIS WEEK FOR NEW CLEARANCES; PRIOR NOTES FROM 3/7/24 & 5/6/24 REVIEWED WILL CHECK CBC TODAY LAB WORK FROM 8/22/24 REVIEWED: CHOLESTEROL 109, LDL 54, CREATININE 1.05 WILL HAVE EKG AT CARDIOLOGY APPOINTMENT THIS WEEK GI/DVT PROPHYLAXIS PER SURGERY AVOIDANCE OF NSAIDS, VITAMINS AND ASPIRIN CONTAINING PRODUCTS PRIOR TO SURGERY BLOOD PRESSURE CONTROLLED DOING WELL ON LIVALO 2MG DAILY - TO CONTINUE CALL WITH ANY QUESTIONS, CONCERNS OR CHANGES PRIOR TO PROCEDURE SUPPORT PROVIDED FOLLOW-UP POST-OPERATIVELY   ADDENDUM 9/4/24: PRE-OP CBC REVIEWED.  SEE PULMONOLOGY CLEARANCE AND RECOMMENDATIONS FROM 8/27/24.  SEE CARDIOLOGY CLEARANCE AND RECOMMENDATIONS FROM 8/29/24

## 2024-09-04 NOTE — HISTORY OF PRESENT ILLNESS
[No Pertinent Cardiac History] : no history of aortic stenosis, atrial fibrillation, coronary artery disease, recent myocardial infarction, or implantable device/pacemaker [Asthma] : asthma [Sleep Apnea] : sleep apnea [No Adverse Anesthesia Reaction] : no adverse anesthesia reaction in self or family member [(Patient denies any chest pain, claudication, dyspnea on exertion, orthopnea, palpitations or syncope)] : Patient denies any chest pain, claudication, dyspnea on exertion, orthopnea, palpitations or syncope [COPD] : no COPD [Smoker] : not a smoker [Chronic Anticoagulation] : no chronic anticoagulation [Chronic Kidney Disease] : no chronic kidney disease [Diabetes] : no diabetes [FreeTextEntry1] : LEFT UPPER LID BLEPHAROPLASTY [FreeTextEntry2] : SEPTEMBER 5, 2024 [FreeTextEntry3] : DR. SIDDIQUI [FreeTextEntry4] : MR. BERKOWITZ IS A PLEASANT 79 YO PRESENTING FOR MEDICAL CLEARANCE FOR UPCOMING EYELID SURGERY. IMPAIRS VISION. NO HISTORY OF MI, STROKE OR SEIZURE. NO PERSONAL OR FAMILY HISTORY OF BLOOD OR CLOTTING DISORDERS. DENIES EASY BLEEDING OR BRUISING. IS ABLE TO WALK MULTIPLE BLOCKS AND GO UP MULTIPLE FLIGHTS OF STAIRS WITHOUT DIFFICULTY. HAS HAD NO HEADACHE, DIZZINESS, CHEST PAIN, SHORTNESS OF BREATH, GI OR URINARY COMPLAINTS. CHRONIC HISTORY OF HYPERTENSION, HYPERLIPIDEMIA, MARILUZ, CAROTID ATHEROSCLEROSIS AND ASTHMA. [FreeTextEntry7] : CARDIOLOGY CLEARANCE 3/7/24 - SEEING FOR NEW CLEARANCE THIS WEEK PULMONOLOGY CLEARANCE 5/6/24 - SEEING FOR NEW CLEARANCE THIS WEEK

## 2024-09-05 ENCOUNTER — NON-APPOINTMENT (OUTPATIENT)
Age: 79
End: 2024-09-05

## 2024-10-17 ENCOUNTER — APPOINTMENT (OUTPATIENT)
Dept: PULMONOLOGY | Facility: CLINIC | Age: 79
End: 2024-10-17
Payer: MEDICARE

## 2024-10-17 VITALS
BODY MASS INDEX: 26.95 KG/M2 | OXYGEN SATURATION: 97 % | HEIGHT: 74 IN | DIASTOLIC BLOOD PRESSURE: 78 MMHG | RESPIRATION RATE: 16 BRPM | HEART RATE: 67 BPM | WEIGHT: 210 LBS | SYSTOLIC BLOOD PRESSURE: 130 MMHG

## 2024-10-17 DIAGNOSIS — J45.20 MILD INTERMITTENT ASTHMA, UNCOMPLICATED: ICD-10-CM

## 2024-10-17 DIAGNOSIS — R06.02 SHORTNESS OF BREATH: ICD-10-CM

## 2024-10-17 DIAGNOSIS — Z86.16 PERSONAL HISTORY OF COVID-19: ICD-10-CM

## 2024-10-17 DIAGNOSIS — E66.3 OVERWEIGHT: ICD-10-CM

## 2024-10-17 DIAGNOSIS — G47.33 OBSTRUCTIVE SLEEP APNEA (ADULT) (PEDIATRIC): ICD-10-CM

## 2024-10-17 DIAGNOSIS — R93.89 ABNORMAL FINDINGS ON DIAGNOSTIC IMAGING OF OTHER SPECIFIED BODY STRUCTURES: ICD-10-CM

## 2024-10-17 PROCEDURE — G2211 COMPLEX E/M VISIT ADD ON: CPT

## 2024-10-17 PROCEDURE — 99214 OFFICE O/P EST MOD 30 MIN: CPT

## 2024-11-06 ENCOUNTER — APPOINTMENT (OUTPATIENT)
Dept: ORTHOPEDIC SURGERY | Facility: CLINIC | Age: 79
End: 2024-11-06
Payer: MEDICARE

## 2024-11-06 VITALS — BODY MASS INDEX: 26.95 KG/M2 | WEIGHT: 210 LBS | HEIGHT: 74 IN

## 2024-11-06 DIAGNOSIS — E78.00 PURE HYPERCHOLESTEROLEMIA, UNSPECIFIED: ICD-10-CM

## 2024-11-06 DIAGNOSIS — M25.871 OTHER SPECIFIED JOINT DISORDERS, RIGHT ANKLE AND FOOT: ICD-10-CM

## 2024-11-06 PROCEDURE — 73630 X-RAY EXAM OF FOOT: CPT | Mod: RT

## 2024-11-06 PROCEDURE — L4350: CPT | Mod: RT

## 2024-11-06 PROCEDURE — 99203 OFFICE O/P NEW LOW 30 MIN: CPT

## 2024-11-06 PROCEDURE — 73600 X-RAY EXAM OF ANKLE: CPT | Mod: RT

## 2024-11-13 ENCOUNTER — APPOINTMENT (OUTPATIENT)
Dept: FAMILY MEDICINE | Facility: CLINIC | Age: 79
End: 2024-11-13

## 2024-12-12 ENCOUNTER — APPOINTMENT (OUTPATIENT)
Dept: DERMATOLOGY | Facility: CLINIC | Age: 79
End: 2024-12-12
Payer: MEDICARE

## 2024-12-12 PROCEDURE — 99213 OFFICE O/P EST LOW 20 MIN: CPT

## 2025-02-27 ENCOUNTER — APPOINTMENT (OUTPATIENT)
Dept: CARDIOLOGY | Facility: CLINIC | Age: 80
End: 2025-02-27
Payer: MEDICARE

## 2025-02-27 VITALS
HEART RATE: 69 BPM | WEIGHT: 219 LBS | SYSTOLIC BLOOD PRESSURE: 130 MMHG | HEIGHT: 74 IN | RESPIRATION RATE: 16 BRPM | BODY MASS INDEX: 28.11 KG/M2 | DIASTOLIC BLOOD PRESSURE: 80 MMHG | OXYGEN SATURATION: 97 %

## 2025-02-27 DIAGNOSIS — I51.7 CARDIOMEGALY: ICD-10-CM

## 2025-02-27 DIAGNOSIS — G47.33 OBSTRUCTIVE SLEEP APNEA (ADULT) (PEDIATRIC): ICD-10-CM

## 2025-02-27 DIAGNOSIS — I77.810 THORACIC AORTIC ECTASIA: ICD-10-CM

## 2025-02-27 DIAGNOSIS — E78.5 HYPERLIPIDEMIA, UNSPECIFIED: ICD-10-CM

## 2025-02-27 DIAGNOSIS — I10 ESSENTIAL (PRIMARY) HYPERTENSION: ICD-10-CM

## 2025-02-27 PROCEDURE — G2211 COMPLEX E/M VISIT ADD ON: CPT

## 2025-02-27 PROCEDURE — 93000 ELECTROCARDIOGRAM COMPLETE: CPT

## 2025-02-27 PROCEDURE — 99214 OFFICE O/P EST MOD 30 MIN: CPT

## 2025-03-12 ENCOUNTER — APPOINTMENT (OUTPATIENT)
Dept: ORTHOPEDIC SURGERY | Facility: CLINIC | Age: 80
End: 2025-03-12
Payer: MEDICARE

## 2025-03-12 VITALS — BODY MASS INDEX: 28.11 KG/M2 | WEIGHT: 219 LBS | HEIGHT: 74 IN

## 2025-03-12 DIAGNOSIS — M25.871 OTHER SPECIFIED JOINT DISORDERS, RIGHT ANKLE AND FOOT: ICD-10-CM

## 2025-03-12 PROCEDURE — 99213 OFFICE O/P EST LOW 20 MIN: CPT

## 2025-04-14 NOTE — REVIEW OF SYSTEMS
David LEWIS, Pharmacy tech from [facility] called and said they did not get the script for Tirzepatide (2.5 mg/0.5 mL.  Patient called them to see why it wasn't filled, but they don't have it.  I told her we had a confirmation from 4/8/25 but she said their fax system is glitchy sometimes.    She asked that the script be resent to them.  Script pending below if appropriate.  
[FreeTextEntry1] : Other than as documented here and in the HPI, the thirteen point ROS is negative

## 2025-04-16 ENCOUNTER — APPOINTMENT (OUTPATIENT)
Dept: PULMONOLOGY | Facility: CLINIC | Age: 80
End: 2025-04-16
Payer: MEDICARE

## 2025-04-16 VITALS — BODY MASS INDEX: 26.95 KG/M2 | WEIGHT: 210 LBS | HEIGHT: 74 IN

## 2025-04-16 VITALS
OXYGEN SATURATION: 98 % | RESPIRATION RATE: 16 BRPM | DIASTOLIC BLOOD PRESSURE: 82 MMHG | SYSTOLIC BLOOD PRESSURE: 144 MMHG | HEART RATE: 64 BPM

## 2025-04-16 DIAGNOSIS — R06.02 SHORTNESS OF BREATH: ICD-10-CM

## 2025-04-16 DIAGNOSIS — E66.3 OVERWEIGHT: ICD-10-CM

## 2025-04-16 DIAGNOSIS — Z86.16 PERSONAL HISTORY OF COVID-19: ICD-10-CM

## 2025-04-16 DIAGNOSIS — J45.20 MILD INTERMITTENT ASTHMA, UNCOMPLICATED: ICD-10-CM

## 2025-04-16 DIAGNOSIS — G47.33 OBSTRUCTIVE SLEEP APNEA (ADULT) (PEDIATRIC): ICD-10-CM

## 2025-04-16 DIAGNOSIS — R93.89 ABNORMAL FINDINGS ON DIAGNOSTIC IMAGING OF OTHER SPECIFIED BODY STRUCTURES: ICD-10-CM

## 2025-04-16 DIAGNOSIS — Z01.818 ENCOUNTER FOR OTHER PREPROCEDURAL EXAMINATION: ICD-10-CM

## 2025-04-16 PROCEDURE — 99214 OFFICE O/P EST MOD 30 MIN: CPT | Mod: 25

## 2025-04-16 PROCEDURE — 94729 DIFFUSING CAPACITY: CPT

## 2025-04-16 PROCEDURE — 85018 HEMOGLOBIN: CPT | Mod: QW

## 2025-04-16 PROCEDURE — 94010 BREATHING CAPACITY TEST: CPT

## 2025-04-16 PROCEDURE — 94727 GAS DIL/WSHOT DETER LNG VOL: CPT
